# Patient Record
Sex: FEMALE | Race: WHITE | NOT HISPANIC OR LATINO | Employment: OTHER | URBAN - METROPOLITAN AREA
[De-identification: names, ages, dates, MRNs, and addresses within clinical notes are randomized per-mention and may not be internally consistent; named-entity substitution may affect disease eponyms.]

---

## 2023-03-24 DIAGNOSIS — E78.2 MIXED HYPERLIPIDEMIA: Primary | ICD-10-CM

## 2023-03-27 RX ORDER — SIMVASTATIN 40 MG/1
40 TABLET, FILM COATED ORAL DAILY
Qty: 90 TABLET | Refills: 0 | Status: SHIPPED | OUTPATIENT
Start: 2023-03-27 | End: 2023-04-10 | Stop reason: SDUPTHER

## 2023-03-27 RX ORDER — SIMVASTATIN 40 MG/1
40 TABLET, FILM COATED ORAL DAILY
Qty: 30 TABLET | Refills: 0 | Status: SHIPPED | OUTPATIENT
Start: 2023-03-27 | End: 2023-03-27 | Stop reason: SDUPTHER

## 2023-04-10 ENCOUNTER — TELEMEDICINE (OUTPATIENT)
Dept: PRIMARY CARE | Facility: CLINIC | Age: 73
End: 2023-04-10
Payer: MEDICARE

## 2023-04-10 DIAGNOSIS — J39.9 UPPER RESPIRATORY DISEASE: ICD-10-CM

## 2023-04-10 DIAGNOSIS — Z00.00 HEALTH CARE MAINTENANCE: ICD-10-CM

## 2023-04-10 DIAGNOSIS — R05.1 ACUTE COUGH: Primary | ICD-10-CM

## 2023-04-10 DIAGNOSIS — E78.2 MIXED HYPERLIPIDEMIA: ICD-10-CM

## 2023-04-10 PROCEDURE — 99213 OFFICE O/P EST LOW 20 MIN: CPT | Performed by: STUDENT IN AN ORGANIZED HEALTH CARE EDUCATION/TRAINING PROGRAM

## 2023-04-10 RX ORDER — SIMVASTATIN 40 MG/1
TABLET, FILM COATED ORAL
COMMUNITY
Start: 2014-06-10 | End: 2023-11-27 | Stop reason: SDUPTHER

## 2023-04-10 RX ORDER — BENZONATATE 100 MG/1
100 CAPSULE ORAL 3 TIMES DAILY PRN
Qty: 42 CAPSULE | Refills: 0 | Status: SHIPPED | OUTPATIENT
Start: 2023-04-10 | End: 2023-05-10

## 2023-04-10 RX ORDER — ZOLPIDEM TARTRATE 10 MG
TABLET ORAL
COMMUNITY
Start: 2021-10-01 | End: 2023-10-18 | Stop reason: ALTCHOICE

## 2023-04-10 RX ORDER — FLUTICASONE PROPIONATE 50 MCG
SPRAY, SUSPENSION (ML) NASAL
COMMUNITY
Start: 2022-09-02 | End: 2023-10-18 | Stop reason: ALTCHOICE

## 2023-04-10 RX ORDER — IBUPROFEN 800 MG/1
1 TABLET ORAL 2 TIMES DAILY PRN
COMMUNITY
Start: 2016-05-24

## 2023-04-10 RX ORDER — AMOXICILLIN AND CLAVULANATE POTASSIUM 875; 125 MG/1; MG/1
875 TABLET, FILM COATED ORAL 2 TIMES DAILY
Qty: 14 TABLET | Refills: 0 | Status: SHIPPED | OUTPATIENT
Start: 2023-04-10 | End: 2023-04-17

## 2023-04-10 RX ORDER — NAPROXEN SODIUM 220 MG/1
1 TABLET, FILM COATED ORAL DAILY
COMMUNITY
Start: 2021-09-27

## 2023-04-10 RX ORDER — SIMVASTATIN 40 MG/1
40 TABLET, FILM COATED ORAL DAILY
Qty: 90 TABLET | Refills: 1 | Status: SHIPPED | OUTPATIENT
Start: 2023-04-10 | End: 2023-10-17

## 2023-04-10 RX ORDER — CHLORHEXIDINE GLUCONATE ORAL RINSE 1.2 MG/ML
SOLUTION DENTAL
COMMUNITY
Start: 2022-06-24 | End: 2023-11-27 | Stop reason: ALTCHOICE

## 2023-04-10 RX ORDER — HYDROCODONE BITARTRATE AND ACETAMINOPHEN 5; 325 MG/1; MG/1
TABLET ORAL
COMMUNITY
Start: 2022-06-24 | End: 2023-10-18 | Stop reason: ALTCHOICE

## 2023-04-10 RX ORDER — TRIAMCINOLONE ACETONIDE 1 MG/G
CREAM TOPICAL
COMMUNITY
Start: 2023-03-10 | End: 2024-01-15 | Stop reason: WASHOUT

## 2023-04-10 RX ORDER — AZITHROMYCIN 250 MG/1
TABLET, FILM COATED ORAL
COMMUNITY
Start: 2023-04-09 | End: 2023-10-17

## 2023-04-10 RX ORDER — ERGOCALCIFEROL 1.25 MG/1
1 CAPSULE ORAL
COMMUNITY
Start: 2014-05-15 | End: 2023-10-18 | Stop reason: ALTCHOICE

## 2023-04-10 ASSESSMENT — ENCOUNTER SYMPTOMS
CARDIOVASCULAR NEGATIVE: 1
MUSCULOSKELETAL NEGATIVE: 1
SINUS PRESSURE: 1
NEUROLOGICAL NEGATIVE: 1
CONSTITUTIONAL NEGATIVE: 1
SINUS PAIN: 1
SORE THROAT: 1
PSYCHIATRIC NEGATIVE: 1
RESPIRATORY NEGATIVE: 1
GASTROINTESTINAL NEGATIVE: 1

## 2023-04-10 NOTE — PROGRESS NOTES
Subjective   Patient ID: Maddie Briseno is a 73 y.o. female who presents for Cough (Cough, congestion, U.C follow up).    Patient seen on virtual evaluation.  Regards that she has been having some symptoms of congestion and overall not feeling well.  Did recently go to urgent care.  States that at the urgent care, she was given a course of azithromycin.  States that her symptoms have been improving slowly, but she is still having symptoms.  She is wondering about other steps or what else she could do.  Tested negative for COVID.  Denies any additional issues or concerns at this time.         Review of Systems   Constitutional: Negative.    HENT:  Positive for postnasal drip, sinus pressure, sinus pain and sore throat.    Respiratory: Negative.     Cardiovascular: Negative.    Gastrointestinal: Negative.    Musculoskeletal: Negative.    Skin: Negative.    Neurological: Negative.    Psychiatric/Behavioral: Negative.         Objective   There were no vitals taken for this visit.    Physical Exam deferred due to virtual evaluation    Assessment/Plan   Problem List Items Addressed This Visit    None  Visit Diagnoses       Acute cough    -  Primary    Relevant Medications    benzonatate (Tessalon) 100 mg capsule    Upper respiratory disease        Relevant Medications    amoxicillin-pot clavulanate (Augmentin) 875-125 mg tablet    Mixed hyperlipidemia        Relevant Medications    simvastatin (Zocor) 40 mg tablet    Health care maintenance        Relevant Orders    Lipid panel    Hemoglobin A1C          Patient seen on virtual evaluation    #Upper respiratory infection  #Sinus infection  Patient recently seen in urgent care due to sinus infection, has been appropriately treated for this, recommend continue azithromycin, we will prescribe Tessalon Perles for symptomatic control.  If patient has no improvement with azithromycin, recommend course of Augmentin as she has had better success with this in the past.  She will  continue to monitor symptoms, could consider x-ray if no improvement after courses of antibiotics    #Hyperlipidemia  On cholesterol medicine, refill today, advised lipid panel and A1c for further evaluation    #Health maintenance  Labs as above  Advised age-appropriate screenings    Return to care in 6 months or as needed

## 2023-09-08 DIAGNOSIS — R60.0 LOWER EXTREMITY EDEMA: Primary | ICD-10-CM

## 2023-09-08 DIAGNOSIS — M19.049 HAND ARTHRITIS: ICD-10-CM

## 2023-09-08 RX ORDER — FUROSEMIDE 20 MG/1
20 TABLET ORAL DAILY
Qty: 90 TABLET | Refills: 0 | Status: SHIPPED | OUTPATIENT
Start: 2023-09-08 | End: 2024-04-15 | Stop reason: WASHOUT

## 2023-10-04 ENCOUNTER — APPOINTMENT (OUTPATIENT)
Dept: LAB | Facility: CLINIC | Age: 73
End: 2023-10-04
Payer: MEDICARE

## 2023-10-04 ENCOUNTER — APPOINTMENT (OUTPATIENT)
Dept: HEMATOLOGY/ONCOLOGY | Facility: CLINIC | Age: 73
End: 2023-10-04
Payer: MEDICARE

## 2023-10-17 PROBLEM — R92.30 DENSE BREAST TISSUE: Status: ACTIVE | Noted: 2023-10-17

## 2023-10-17 PROBLEM — G45.9 TRANSIENT ISCHEMIC ATTACK: Status: ACTIVE | Noted: 2023-10-17

## 2023-10-17 PROBLEM — R35.0 URINARY FREQUENCY: Status: ACTIVE | Noted: 2023-10-17

## 2023-10-17 PROBLEM — N95.2 VAGINAL ATROPHY: Status: ACTIVE | Noted: 2023-10-17

## 2023-10-17 PROBLEM — Q21.12 PFO (PATENT FORAMEN OVALE) (HHS-HCC): Status: ACTIVE | Noted: 2023-10-17

## 2023-10-17 PROBLEM — R42 INTERMITTENT LIGHTHEADEDNESS: Status: ACTIVE | Noted: 2023-10-17

## 2023-10-17 PROBLEM — E55.9 VITAMIN D DEFICIENCY: Status: ACTIVE | Noted: 2023-10-17

## 2023-10-17 PROBLEM — I10 HTN (HYPERTENSION): Status: ACTIVE | Noted: 2023-10-17

## 2023-10-17 PROBLEM — R00.2 HEART PALPITATIONS: Status: ACTIVE | Noted: 2023-10-17

## 2023-10-17 PROBLEM — K60.2 ANAL FISSURE: Status: ACTIVE | Noted: 2023-10-17

## 2023-10-17 PROBLEM — G47.00 INSOMNIA: Status: ACTIVE | Noted: 2023-10-17

## 2023-10-17 RX ORDER — BIOTIN 5 MG
5 TABLET ORAL
COMMUNITY
End: 2023-10-18 | Stop reason: ALTCHOICE

## 2023-10-17 RX ORDER — EPINEPHRINE 0.22MG
100 AEROSOL WITH ADAPTER (ML) INHALATION
COMMUNITY

## 2023-10-17 RX ORDER — GLUCOSAMINE/D3/BOSWELLIA SERRA 1500MG-400
1 TABLET ORAL
COMMUNITY
End: 2023-10-18 | Stop reason: ALTCHOICE

## 2023-10-17 RX ORDER — MECLIZINE HYDROCHLORIDE 25 MG/1
1 TABLET ORAL 3 TIMES DAILY PRN
COMMUNITY
Start: 2022-10-19 | End: 2023-10-18 | Stop reason: ALTCHOICE

## 2023-10-17 RX ORDER — MIRABEGRON 50 MG/1
50 TABLET, FILM COATED, EXTENDED RELEASE ORAL DAILY
COMMUNITY
End: 2024-01-15 | Stop reason: WASHOUT

## 2023-10-18 ENCOUNTER — OFFICE VISIT (OUTPATIENT)
Dept: CARDIOLOGY | Facility: CLINIC | Age: 73
End: 2023-10-18
Payer: MEDICARE

## 2023-10-18 VITALS
WEIGHT: 175 LBS | SYSTOLIC BLOOD PRESSURE: 130 MMHG | BODY MASS INDEX: 32.2 KG/M2 | HEART RATE: 74 BPM | OXYGEN SATURATION: 98 % | HEIGHT: 62 IN | DIASTOLIC BLOOD PRESSURE: 76 MMHG

## 2023-10-18 DIAGNOSIS — I10 PRIMARY HYPERTENSION: ICD-10-CM

## 2023-10-18 DIAGNOSIS — R60.0 LOCALIZED EDEMA: Primary | ICD-10-CM

## 2023-10-18 PROCEDURE — 3078F DIAST BP <80 MM HG: CPT | Performed by: PHYSICIAN ASSISTANT

## 2023-10-18 PROCEDURE — 3075F SYST BP GE 130 - 139MM HG: CPT | Performed by: PHYSICIAN ASSISTANT

## 2023-10-18 PROCEDURE — 1126F AMNT PAIN NOTED NONE PRSNT: CPT | Performed by: PHYSICIAN ASSISTANT

## 2023-10-18 PROCEDURE — 1159F MED LIST DOCD IN RCRD: CPT | Performed by: PHYSICIAN ASSISTANT

## 2023-10-18 PROCEDURE — 99214 OFFICE O/P EST MOD 30 MIN: CPT | Performed by: PHYSICIAN ASSISTANT

## 2023-10-18 ASSESSMENT — PAIN SCALES - GENERAL: PAINLEVEL: 0-NO PAIN

## 2023-10-18 NOTE — PROGRESS NOTES
"Chief Complaint:   C/O edema on both ankles x a couple months     History Of Present Illness:    Maddie Briseno is a 73 y.o. female presenting with peripheral edema.  Patient reports persistent bilateral lower extremity edema over the past few months.  This was previously treated with furosemide 20mg every other day, however due to patient's active work schedule and inability to frequent the restroom she has not taken this for some time.  No associated dyspnea on exertion, chest pain/pressure, PND, orthopnea, syncope, claudication.  Patient is not treated with antihypertensive therapy at this time.  Previous 2D echo with normal LV systolic function and no considerable valvulopathy.     Last Recorded Vitals:  Vitals:    10/18/23 1455   BP: 130/76   BP Location: Right arm   Patient Position: Sitting   BP Cuff Size: Adult   Pulse: 74   SpO2: 98%   Weight: 79.4 kg (175 lb)   Height: 1.575 m (5' 2\")       Past Medical History:  She has a past medical history of Disease of upper respiratory tract, unspecified, Personal history of other diseases of the musculoskeletal system and connective tissue, and Personal history of other endocrine, nutritional and metabolic disease.    Past Surgical History:  She has a past surgical history that includes Other surgical history (2014);  section, classic (2014); Colonoscopy (2015); Back surgery (2015); Other surgical history (10/13/2021); CT angio head w and wo IV contrast (2021); and CT angio neck w and wo IV contrast (2021).      Social History:  She has no history on file for tobacco use, alcohol use, and drug use.    Family History:  No family history on file.     Allergies:  Cephalexin    Outpatient Medications:  Current Outpatient Medications   Medication Instructions    aspirin 81 mg chewable tablet 1 tablet, oral, Daily    chlorhexidine (Peridex) 0.12 % solution RINSE 1/2 FLUID OUNCE IN MOUTH FOR 30 SECONDS AND SPIT OUT TWICE DAILY " (MORNING AND EVENING) AFTER BRUSHING. NO FLUIDS AFTERWARDS FOR 30 MIN    coenzyme Q-10 100 mg, oral, Daily RT    furosemide (LASIX) 20 mg, oral, Daily    ibuprofen 800 mg tablet 1 tablet, oral, 2 times daily PRN    mv-mn/om3/dha/epa/fish/lut/geovanny (OCUVITE ADULT 50 PLUS ORAL) 1 tablet, oral, Daily RT    Myrbetriq 50 mg, oral, Daily    simvastatin (Zocor) 40 mg tablet oral    triamcinolone (Kenalog) 0.1 % cream APPLY THIN FILM TO LEG RASH 1 TO 2 TIMES DAILY       Physical Exam:  Constitutional: awake and alert, oriented ×3, no apparent distress  Skin: warm, dry, good turgor no obvious lesions  Eyes: pupils equal, round, reactive to light, conjunctiva pink and noninjected, no discharge  HENT: normocephalic and atraumatic, mucous membranes moist, trachea midline with no masses/goiter  Cardiovascular: S1/S2 regular, no murmur no rubs/gallops, no carotid bruits, no JVD  Pulmonary: symmetrical chest expansion, lungs are clear to auscultation bilaterally, no wheezes/rales/rhonchi, normal effort  Abdomen: nontender, nondistended, active bowel sounds, no ascites  Extremities: no cyanosis, clubbing, no LE edema no lesions; palpable pedal pulses  Neurologic: cranial nerves II - XII grossly intact, stable gait, no tremor       Last Labs:  CBC -  Lab Results   Component Value Date    WBC 4.7 03/03/2023    HGB 13.2 03/03/2023    HCT 42.2 03/03/2023    MCV 86 03/03/2023     03/03/2023       CMP -  Lab Results   Component Value Date    CALCIUM 9.0 03/03/2023    PROT 6.6 03/03/2023    ALBUMIN 4.1 03/03/2023    AST 18 03/03/2023    ALT 15 03/03/2023    ALKPHOS 58 03/03/2023    BILITOT 0.7 03/03/2023       LIPID PANEL -   Lab Results   Component Value Date    CHOL 148 09/26/2021    TRIG 62 09/26/2021    HDL 53.0 09/26/2021    CHHDL 2.8 09/26/2021    LDLF 83 09/26/2021    VLDL 12 09/26/2021       RENAL FUNCTION PANEL -   Lab Results   Component Value Date    GLUCOSE 112 (H) 03/03/2023     03/03/2023    K 3.8 03/03/2023    CL  "106 03/03/2023    CO2 25 03/03/2023    ANIONGAP 12 03/03/2023    BUN 17 03/03/2023    CREATININE 0.95 03/03/2023    CALCIUM 9.0 03/03/2023    ALBUMIN 4.1 03/03/2023        Lab Results   Component Value Date     (H) 09/26/2021    HGBA1C 5.8 (A) 09/26/2021       Last Cardiology Tests:  ECG:  No results found for this or any previous visit from the past 1095 days.      Echo:  9/2021  1. The left ventricular systolic function is normal with a 55-60% estimated ejection fraction.   2. Spectral Doppler shows an impaired relaxation pattern of left ventricular diastolic filling.   3. The left atrium is moderately dilated.   4. RVSP within normal limits.   5. Positive Saline bubble study suggesting PFO.    Ejection Fractions:  No results found for: \"EF\"    Cath:  No results found for this or any previous visit from the past 1095 days.      Stress Test:  No results found for this or any previous visit from the past 1095 days.      Cardiac Imaging:  No results found for this or any previous visit from the past 1095 days.            Assessment/Plan   Problem List Items Addressed This Visit             ICD-10-CM       Cardiac and Vasculature    HTN (hypertension) I10     Other Visit Diagnoses         Codes    Localized edema    -  Primary R60.0    Relevant Orders    Transthoracic echo (TTE) complete          -We will arrange for a 2D echocardiogram to assess LVEF and valvular function.    -Take furosemide 20mg on all days you are not required to be at work.    -No additional signs/symptoms concerning for volume overload.    -F/U in 3 months.    Amador Mota PA-C  "

## 2023-10-23 PROBLEM — D47.9 LYMPHOPROLIFERATIVE DISORDER (MULTI): Status: ACTIVE | Noted: 2023-10-23

## 2023-10-25 ENCOUNTER — APPOINTMENT (OUTPATIENT)
Dept: CARDIOLOGY | Facility: CLINIC | Age: 73
End: 2023-10-25
Payer: MEDICARE

## 2023-10-26 ENCOUNTER — LAB (OUTPATIENT)
Dept: LAB | Facility: CLINIC | Age: 73
End: 2023-10-26
Payer: MEDICARE

## 2023-10-26 ENCOUNTER — OFFICE VISIT (OUTPATIENT)
Dept: HEMATOLOGY/ONCOLOGY | Facility: CLINIC | Age: 73
End: 2023-10-26
Payer: MEDICARE

## 2023-10-26 VITALS
SYSTOLIC BLOOD PRESSURE: 130 MMHG | TEMPERATURE: 97.9 F | DIASTOLIC BLOOD PRESSURE: 73 MMHG | WEIGHT: 173.06 LBS | HEIGHT: 62 IN | OXYGEN SATURATION: 94 % | BODY MASS INDEX: 31.85 KG/M2 | HEART RATE: 72 BPM | RESPIRATION RATE: 20 BRPM

## 2023-10-26 DIAGNOSIS — D59.4 HEMOLYTIC ANEMIA ASSOCIATED WITH LYMPHOPROLIFERATIVE DISORDER (MULTI): ICD-10-CM

## 2023-10-26 DIAGNOSIS — D47.9 HEMOLYTIC ANEMIA ASSOCIATED WITH LYMPHOPROLIFERATIVE DISORDER (MULTI): ICD-10-CM

## 2023-10-26 DIAGNOSIS — D47.9 LYMPHOPROLIFERATIVE DISORDER (MULTI): Primary | ICD-10-CM

## 2023-10-26 LAB
ALBUMIN SERPL BCP-MCNC: 4.4 G/DL (ref 3.4–5)
ALP SERPL-CCNC: 69 U/L (ref 33–136)
ALT SERPL W P-5'-P-CCNC: 17 U/L (ref 7–45)
ANION GAP SERPL CALC-SCNC: 13 MMOL/L (ref 10–20)
AST SERPL W P-5'-P-CCNC: 21 U/L (ref 9–39)
BASOPHILS # BLD AUTO: 0.07 X10*3/UL (ref 0–0.1)
BASOPHILS NFR BLD AUTO: 1.2 %
BILIRUB SERPL-MCNC: 0.5 MG/DL (ref 0–1.2)
BUN SERPL-MCNC: 16 MG/DL (ref 6–23)
CALCIUM SERPL-MCNC: 9.2 MG/DL (ref 8.6–10.3)
CHLORIDE SERPL-SCNC: 103 MMOL/L (ref 98–107)
CO2 SERPL-SCNC: 26 MMOL/L (ref 21–32)
CREAT SERPL-MCNC: 1.07 MG/DL (ref 0.5–1.05)
EOSINOPHIL # BLD AUTO: 0.12 X10*3/UL (ref 0–0.4)
EOSINOPHIL NFR BLD AUTO: 2 %
ERYTHROCYTE [DISTWIDTH] IN BLOOD BY AUTOMATED COUNT: 12.8 % (ref 11.5–14.5)
GFR SERPL CREATININE-BSD FRML MDRD: 55 ML/MIN/1.73M*2
GLUCOSE SERPL-MCNC: 131 MG/DL (ref 74–99)
HCT VFR BLD AUTO: 40.9 % (ref 36–46)
HGB BLD-MCNC: 13.3 G/DL (ref 12–16)
IMM GRANULOCYTES # BLD AUTO: 0.02 X10*3/UL (ref 0–0.5)
IMM GRANULOCYTES NFR BLD AUTO: 0.3 % (ref 0–0.9)
LYMPHOCYTES # BLD AUTO: 1.65 X10*3/UL (ref 0.8–3)
LYMPHOCYTES NFR BLD AUTO: 27.7 %
MCH RBC QN AUTO: 27.6 PG (ref 26–34)
MCHC RBC AUTO-ENTMCNC: 32.5 G/DL (ref 32–36)
MCV RBC AUTO: 85 FL (ref 80–100)
MONOCYTES # BLD AUTO: 0.4 X10*3/UL (ref 0.05–0.8)
MONOCYTES NFR BLD AUTO: 6.7 %
NEUTROPHILS # BLD AUTO: 3.7 X10*3/UL (ref 1.6–5.5)
NEUTROPHILS NFR BLD AUTO: 62.1 %
NRBC BLD-RTO: 0 /100 WBCS (ref 0–0)
PLATELET # BLD AUTO: 210 X10*3/UL (ref 150–450)
PMV BLD AUTO: 9 FL (ref 7.5–11.5)
POTASSIUM SERPL-SCNC: 3.7 MMOL/L (ref 3.5–5.3)
PROT SERPL-MCNC: 6.7 G/DL (ref 6.4–8.2)
RBC # BLD AUTO: 4.82 X10*6/UL (ref 4–5.2)
SODIUM SERPL-SCNC: 138 MMOL/L (ref 136–145)
WBC # BLD AUTO: 6 X10*3/UL (ref 4.4–11.3)

## 2023-10-26 PROCEDURE — 99213 OFFICE O/P EST LOW 20 MIN: CPT | Performed by: INTERNAL MEDICINE

## 2023-10-26 PROCEDURE — 3078F DIAST BP <80 MM HG: CPT | Performed by: INTERNAL MEDICINE

## 2023-10-26 PROCEDURE — 1126F AMNT PAIN NOTED NONE PRSNT: CPT | Performed by: INTERNAL MEDICINE

## 2023-10-26 PROCEDURE — 3075F SYST BP GE 130 - 139MM HG: CPT | Performed by: INTERNAL MEDICINE

## 2023-10-26 PROCEDURE — 85025 COMPLETE CBC W/AUTO DIFF WBC: CPT

## 2023-10-26 PROCEDURE — 80053 COMPREHEN METABOLIC PANEL: CPT

## 2023-10-26 PROCEDURE — 36415 COLL VENOUS BLD VENIPUNCTURE: CPT

## 2023-10-26 PROCEDURE — 1159F MED LIST DOCD IN RCRD: CPT | Performed by: INTERNAL MEDICINE

## 2023-10-26 ASSESSMENT — PAIN SCALES - GENERAL: PAINLEVEL: 0-NO PAIN

## 2023-10-26 NOTE — PROGRESS NOTES
LOCATION:  [facility] at [facility].     HEMATOLOGY & ONCOLOGY PROBLEMS:  1.  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       a.  Incidental finding on routine colonoscopy in 2022.       b.  Negative staging PET scan.          c.  Currently being followed by close observation.     CHIEF COMPLAINT:    The patient is in the clinic today for management of colonic low-grade non-Hodgkin lymphoma.     HISTORY:   Ms. Briseno is a 72 year old pleasant female with incidental finding of atypical lymphoid infiltrate near the appendiceal orifice on routine colonoscopy in 2022.  Clinically she has issues with generalized weakness and fatigue but no other specific  localizing signs and symptoms.  Routine colonoscopy done by  revealed lymphoid aggregates near the appendiceal origin with further histochemical and pathology review suggestive of most probable low-grade lymphoproliferative disorder.  Her last  colonoscopy in  was unremarkable.  A staging PET scan was unremarkable other than mild activity at the colon biopsy site.  There was no evidence of visceral lymphadenopathy etc.  She is currently being followed by close observation.     INTERVAL HISTORY:  Denies any specific new complaints.  No history of fever, weight loss or night sweats. She has been recently started on diuretics for ankle edema.     PAST MEDICAL HISTORY:   1.  Hyperlipidemia.   2.  History of TIA.   3.  Chronic insomnia   4.  Degenerative joint disease   5.  History of C-sections, back / wrist and cervical fusion surgeries.     SOCIAL HISTORY:    for 41 years and lives in [place].  Non-smoker.  Rare social alcohol intake.  She work as a part-time  at a local drug [employer].  Born and raised in [place].     FAMILY HISTORY:    Father  at age 58 from myocardial infarction.  Mother  at age 92 from lung cancer.  1 brother  at age 72, patient not aware of the medical  details.  2 children and 5 grandkids all alive and well. No other specific history of bleeding, clotting  or malignant disorder in the family.     REVIEW OF SYSTEMS:  Pertinent finding as per the history above.  All other systems have been reviewed and generally negative and noncontributory.     PHYSICAL EXAMINATION:    Detailed examination not done.     Allergies and Intolerances:       Allergies:         Keflex: Drug, Other, Active     Outpatient Medication Profile:  * Patient Currently Takes Medications as of 03-Mar-2023 13:01 documented in Structured Notes         simvastatin 40 mg oral tablet : Last Dose Taken:  , 1 tab(s) orally once a day (at bedtime) , Start Date: 27-Sep-2021         aspirin 81 mg oral tablet, chewable: Last Dose Taken:  , 1 tab(s) orally  once a day, Start Date: 27-Sep-2021         Ambien 10 mg oral tablet: Last Dose Taken:  , 1/4 tab(s) orally once  a day (at bedtime)         ZOLPIDEM         Vitamin D3: Last Dose Taken:  , [address] international unit(s) orally once  a week     Lab Results:  CBC and CMP from today is unremarkable.     Radiology Result:  PET/CT Lymphoma Staging [Feb 7 2023  2:47PM]  Impression:  1. Moderately intense focal hypermetabolic activity with associated wall thickening in the distal cecum, unchanged from prior PET-CT and  likely corresponding the patient's biopsy-proven low-grade B-cell lymphoma.  2. No new sites of focal hypermetabolic disease to suggest lymphomatous involvement.     PET/CT Lymphoma Initial [Apr 19 2022  2:57PM]  Impression:  1. Focal hypermetabolic activity associated with mild wall thickening of the distal cecum which may represent patient's known low-grade  non-Hodgkin's lymphoma (as stated in the Hematology/Oncology note from EMR chart review).  2. No abnormal hypermetabolic activity elsewhere in the body to suggest lymphomatous involvement.    Pathology Results:  Specimens: [facility], [id number] ( 2/8/2022)   Name [facility]    Accession #: [id number]   Pathologist: FANY MORELAND JR, MD, PhD.   Date of Procedure: 6/20/2022    Submitting Physician: PASTORA BENITEZ MD   Location: [facility] Other External # [id number]   FINAL DIAGNOSIS   A. COLON, BIOPSY([facility],  (2/8/2022)):   -- LOW GRADE B CELL LYMPHOMA, FAVOR EXTRANODAL MARGINAL ZONE LYMPHOMA.    MORPHOLOGY: The specimen consist of several fragments of colonic mucosa effaced by sheets of small lymphoid cells with slightly irregular nuclear contour and scant cytoplasm. A focal lymphoepithelial lesion is seen.   IMMUNOHISTOCHEMISTRY:   Immunohistochemical  stains performed at the outside institution and reviewed at [facility] reveal the atypical lymphoid infiltrate is   CD20: Positive   BCL2: Positive   CD3 & CD5: Negative. Highlights background T cells.   Cyclin D1: Negative   CD10: Negative.  Highlights few residual germinal centers.   CD43: Negative. Highlights background T cells and plasma cells   No meshwork stains was performed. No kappa/lambda staining to evaluate for plasma cell light chain restriction was performed. Ki67 proliferation  index was not assesed.   MOLECULAR STUDIES: Per report molecular studies was positive for a clonal IgH and Ig kappa rearrangement.   CYTOGENETICS: FISH performed and interpreted at outside institution, ([facility], [place], [place]), was reported to be negative for BIRC-MALT1 fusion.   The gross and/or microscopic findings were reviewed in conjunction with pathology resident, BROOKLYN Lott.   Electronically Signed Out By FANY MORELAND JR, MD,  PhD./DODIE      Assessment and Plan:   1.  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 Please refer to the details of initial presentation and management as outlined above. In  summary, patient with incidental finding of low-grade B-cell lymphoproliferative disorder involving the colon on routine colonoscopy in Feb 2022.  Clinically  she has issues with generalized anxiety and weakness but no specific history of night sweats,  weight loss or fever.  After initial hematology oncology evaluation of follow-up PET scan was completely unremarkable.     Again, long discussion with the patient and her  and they were explained about the initial pathology reports and the PET scan findings.  She had a very isolated foci of low-grade lymphoproliferative lesion in the colon.  There is no evidence of  locoregional lymphadenopathy or metastatic disease.  Clinically she is essentially asymptomatic and follow-up blood work has been unremarkable.  She is currently being followed by close observation.     2. Follow up:  Patient will return to the clinic in 6 months.  Advised to contact us immediately if there are any new questions or problems.     This note has been transcribed using Dragon voice recognition system and there is a possibility of unintentional typing misprints.

## 2023-10-30 ENCOUNTER — HOSPITAL ENCOUNTER (OUTPATIENT)
Dept: CARDIOLOGY | Facility: CLINIC | Age: 73
Discharge: HOME | End: 2023-10-30
Payer: MEDICARE

## 2023-10-30 DIAGNOSIS — R60.0 LOCALIZED EDEMA: ICD-10-CM

## 2023-10-30 LAB — EJECTION FRACTION APICAL 4 CHAMBER: 62.8

## 2023-10-30 PROCEDURE — 93306 TTE W/DOPPLER COMPLETE: CPT | Performed by: STUDENT IN AN ORGANIZED HEALTH CARE EDUCATION/TRAINING PROGRAM

## 2023-10-30 PROCEDURE — 93306 TTE W/DOPPLER COMPLETE: CPT

## 2023-11-01 ENCOUNTER — APPOINTMENT (OUTPATIENT)
Dept: OBSTETRICS AND GYNECOLOGY | Facility: CLINIC | Age: 73
End: 2023-11-01
Payer: MEDICARE

## 2023-11-27 ENCOUNTER — OFFICE VISIT (OUTPATIENT)
Dept: PRIMARY CARE | Facility: CLINIC | Age: 73
End: 2023-11-27
Payer: MEDICARE

## 2023-11-27 VITALS — WEIGHT: 175 LBS | DIASTOLIC BLOOD PRESSURE: 68 MMHG | SYSTOLIC BLOOD PRESSURE: 106 MMHG | BODY MASS INDEX: 31.96 KG/M2

## 2023-11-27 DIAGNOSIS — Z00.00 ROUTINE GENERAL MEDICAL EXAMINATION AT HEALTH CARE FACILITY: Primary | ICD-10-CM

## 2023-11-27 DIAGNOSIS — Z12.31 ENCOUNTER FOR SCREENING MAMMOGRAM FOR BREAST CANCER: ICD-10-CM

## 2023-11-27 DIAGNOSIS — Z23 INFLUENZA VACCINE NEEDED: ICD-10-CM

## 2023-11-27 DIAGNOSIS — D17.9 LIPOMA, UNSPECIFIED SITE: ICD-10-CM

## 2023-11-27 DIAGNOSIS — E78.2 MIXED HYPERLIPIDEMIA: ICD-10-CM

## 2023-11-27 PROCEDURE — 1160F RVW MEDS BY RX/DR IN RCRD: CPT | Performed by: STUDENT IN AN ORGANIZED HEALTH CARE EDUCATION/TRAINING PROGRAM

## 2023-11-27 PROCEDURE — G0008 ADMIN INFLUENZA VIRUS VAC: HCPCS | Performed by: STUDENT IN AN ORGANIZED HEALTH CARE EDUCATION/TRAINING PROGRAM

## 2023-11-27 PROCEDURE — G0439 PPPS, SUBSEQ VISIT: HCPCS | Performed by: STUDENT IN AN ORGANIZED HEALTH CARE EDUCATION/TRAINING PROGRAM

## 2023-11-27 PROCEDURE — 1126F AMNT PAIN NOTED NONE PRSNT: CPT | Performed by: STUDENT IN AN ORGANIZED HEALTH CARE EDUCATION/TRAINING PROGRAM

## 2023-11-27 PROCEDURE — 1170F FXNL STATUS ASSESSED: CPT | Performed by: STUDENT IN AN ORGANIZED HEALTH CARE EDUCATION/TRAINING PROGRAM

## 2023-11-27 PROCEDURE — 90686 IIV4 VACC NO PRSV 0.5 ML IM: CPT | Performed by: STUDENT IN AN ORGANIZED HEALTH CARE EDUCATION/TRAINING PROGRAM

## 2023-11-27 PROCEDURE — 99213 OFFICE O/P EST LOW 20 MIN: CPT | Performed by: STUDENT IN AN ORGANIZED HEALTH CARE EDUCATION/TRAINING PROGRAM

## 2023-11-27 PROCEDURE — 1159F MED LIST DOCD IN RCRD: CPT | Performed by: STUDENT IN AN ORGANIZED HEALTH CARE EDUCATION/TRAINING PROGRAM

## 2023-11-27 PROCEDURE — 3074F SYST BP LT 130 MM HG: CPT | Performed by: STUDENT IN AN ORGANIZED HEALTH CARE EDUCATION/TRAINING PROGRAM

## 2023-11-27 PROCEDURE — 1036F TOBACCO NON-USER: CPT | Performed by: STUDENT IN AN ORGANIZED HEALTH CARE EDUCATION/TRAINING PROGRAM

## 2023-11-27 PROCEDURE — 3078F DIAST BP <80 MM HG: CPT | Performed by: STUDENT IN AN ORGANIZED HEALTH CARE EDUCATION/TRAINING PROGRAM

## 2023-11-27 RX ORDER — SIMVASTATIN 40 MG/1
40 TABLET, FILM COATED ORAL NIGHTLY
Qty: 90 TABLET | Refills: 1 | Status: SHIPPED | OUTPATIENT
Start: 2023-11-27

## 2023-11-27 ASSESSMENT — PATIENT HEALTH QUESTIONNAIRE - PHQ9
SUM OF ALL RESPONSES TO PHQ9 QUESTIONS 1 AND 2: 0
1. LITTLE INTEREST OR PLEASURE IN DOING THINGS: NOT AT ALL
2. FEELING DOWN, DEPRESSED OR HOPELESS: NOT AT ALL

## 2023-11-27 ASSESSMENT — ACTIVITIES OF DAILY LIVING (ADL)
GROCERY_SHOPPING: INDEPENDENT
DOING_HOUSEWORK: INDEPENDENT
MANAGING_FINANCES: INDEPENDENT
BATHING: INDEPENDENT
TAKING_MEDICATION: INDEPENDENT
DRESSING: INDEPENDENT

## 2023-11-27 ASSESSMENT — ENCOUNTER SYMPTOMS
CARDIOVASCULAR NEGATIVE: 1
GASTROINTESTINAL NEGATIVE: 1
PSYCHIATRIC NEGATIVE: 1
CONSTITUTIONAL NEGATIVE: 1
NEUROLOGICAL NEGATIVE: 1
MUSCULOSKELETAL NEGATIVE: 1
RESPIRATORY NEGATIVE: 1

## 2023-11-27 NOTE — PROGRESS NOTES
Follow up and med refill and bumps on head and wellness visit        Subjective   Reason for Visit: Maddie Briseno is an 73 y.o. female here for a Medicare Wellness visit.          Reviewed all medications by prescribing practitioner or clinical pharmacist (such as prescriptions, OTCs, herbal therapies and supplements) and documented in the medical record.    HPI    Patient Care Team:  Carl Melissa DO as PCP - General  Loc Reeder MD as Consulting Physician (Hematology and Oncology)     Review of Systems    Objective   Vitals:  /68   Wt 79.4 kg (175 lb)   BMI 31.96 kg/m²       Physical Exam    Assessment/Plan   Problem List Items Addressed This Visit    None

## 2023-11-27 NOTE — PROGRESS NOTES
"Subjective   Reason for Visit: Maddie Briseno is an 73 y.o. female here for a Medicare Wellness visit.     Past Medical, Surgical, and Family History reviewed and updated in chart.    Reviewed all medications by prescribing practitioner or clinical pharmacist (such as prescriptions, OTCs, herbal therapies and supplements) and documented in the medical record.    Patient seen on follow-up.  States that she is overall doing well.  Has started noticing some \"bumps\" on her head that have been noticed by her hairdresser.  States these are nonpainful in nature, however when rubbed multiple times they do get irritated.  Otherwise, states she is overall doing well would like a flu and RSV vaccine as well.        Patient Care Team:  Carl Melissa DO as PCP - General  Loc Reeder MD as Consulting Physician (Hematology and Oncology)     Review of Systems   Constitutional: Negative.    HENT: Negative.     Respiratory: Negative.     Cardiovascular: Negative.    Gastrointestinal: Negative.    Musculoskeletal: Negative.    Neurological: Negative.    Psychiatric/Behavioral: Negative.         Objective   Vitals:  /68   Wt 79.4 kg (175 lb)   BMI 31.96 kg/m²       Physical Exam  Constitutional:       General: She is not in acute distress.     Appearance: She is not ill-appearing.   Eyes:      Pupils: Pupils are equal, round, and reactive to light.   Cardiovascular:      Rate and Rhythm: Normal rate and regular rhythm.      Pulses: Normal pulses.      Heart sounds: No murmur heard.  Pulmonary:      Effort: No respiratory distress.      Breath sounds: No wheezing.   Abdominal:      General: Abdomen is flat. Bowel sounds are normal. There is no distension.   Musculoskeletal:      Right lower leg: No edema.      Left lower leg: No edema.   Skin:     General: Skin is warm and dry.   Neurological:      Mental Status: She is alert. Mental status is at baseline.      Cranial Nerves: No cranial nerve deficit.      Motor: No " weakness.   Psychiatric:         Mood and Affect: Mood normal.         Behavior: Behavior normal.         Assessment/Plan   Problem List Items Addressed This Visit    None  Visit Diagnoses       Routine general medical examination at health care facility    -  Primary    Lipoma, unspecified site        Mixed hyperlipidemia        Encounter for screening mammogram for breast cancer        Relevant Orders    BI mammo bilateral screening tomosynthesis             72-year-old here on follow-up    Elevated blood pressure reading during colonoscopy  Previously elevated blood pressure readings  Continue on Lasix daily, stable at this time, monitor     History of TIA  -Has followed with neurology as well as cardiology after discovering PFO  -Continue aspirin 81 mg daily  -Continue simvastatin 40 mg daily     Insomnia  Ambien has been discontinued continue current regimen    #Concern for lipomas  Palpated on exam recommended ultrasound of head to  further evaluate     Healthcare maintenance  -Colonoscopy 2/2022, repeat 5-10 years  -Had mammogram done 2021 through [facility]  -Received vaccination for COVID-19, recommended booster  -Recommended Shingrix and Pneumovax     RTC 6 months or earlier as needed     This note was dictated by speech recognition. Minor errors in transcription may be present.

## 2023-12-14 ENCOUNTER — ANCILLARY PROCEDURE (OUTPATIENT)
Dept: RADIOLOGY | Facility: CLINIC | Age: 73
End: 2023-12-14
Payer: MEDICARE

## 2023-12-14 DIAGNOSIS — D17.9 LIPOMA, UNSPECIFIED SITE: ICD-10-CM

## 2023-12-14 DIAGNOSIS — Z12.31 ENCOUNTER FOR SCREENING MAMMOGRAM FOR BREAST CANCER: ICD-10-CM

## 2023-12-14 PROCEDURE — 77067 SCR MAMMO BI INCL CAD: CPT

## 2023-12-14 PROCEDURE — 77063 BREAST TOMOSYNTHESIS BI: CPT

## 2023-12-14 PROCEDURE — 76506 ECHO EXAM OF HEAD: CPT

## 2023-12-14 PROCEDURE — 76506 ECHO EXAM OF HEAD: CPT | Performed by: RADIOLOGY

## 2023-12-14 PROCEDURE — 77067 SCR MAMMO BI INCL CAD: CPT | Performed by: RADIOLOGY

## 2023-12-14 PROCEDURE — 77063 BREAST TOMOSYNTHESIS BI: CPT | Performed by: RADIOLOGY

## 2024-01-12 DIAGNOSIS — D47.9 LYMPHOPROLIFERATIVE DISORDER (MULTI): ICD-10-CM

## 2024-01-15 ENCOUNTER — LAB (OUTPATIENT)
Dept: LAB | Facility: CLINIC | Age: 74
End: 2024-01-15
Payer: MEDICARE

## 2024-01-15 ENCOUNTER — OFFICE VISIT (OUTPATIENT)
Dept: CARDIOLOGY | Facility: CLINIC | Age: 74
End: 2024-01-15
Payer: MEDICARE

## 2024-01-15 VITALS
DIASTOLIC BLOOD PRESSURE: 72 MMHG | SYSTOLIC BLOOD PRESSURE: 116 MMHG | HEART RATE: 63 BPM | WEIGHT: 175.6 LBS | BODY MASS INDEX: 32.07 KG/M2 | OXYGEN SATURATION: 93 %

## 2024-01-15 DIAGNOSIS — D47.9 LYMPHOPROLIFERATIVE DISORDER (MULTI): ICD-10-CM

## 2024-01-15 DIAGNOSIS — R00.2 HEART PALPITATIONS: Primary | ICD-10-CM

## 2024-01-15 DIAGNOSIS — I10 PRIMARY HYPERTENSION: ICD-10-CM

## 2024-01-15 LAB
ALBUMIN SERPL BCP-MCNC: 3.8 G/DL (ref 3.4–5)
ALP SERPL-CCNC: 68 U/L (ref 33–136)
ALT SERPL W P-5'-P-CCNC: 15 U/L (ref 7–45)
ANION GAP SERPL CALC-SCNC: 11 MMOL/L (ref 10–20)
AST SERPL W P-5'-P-CCNC: 17 U/L (ref 9–39)
BASOPHILS # BLD AUTO: 0.1 X10*3/UL (ref 0–0.1)
BASOPHILS NFR BLD AUTO: 1.8 %
BILIRUB SERPL-MCNC: 0.5 MG/DL (ref 0–1.2)
BUN SERPL-MCNC: 20 MG/DL (ref 6–23)
CALCIUM SERPL-MCNC: 9.1 MG/DL (ref 8.6–10.3)
CHLORIDE SERPL-SCNC: 108 MMOL/L (ref 98–107)
CO2 SERPL-SCNC: 25 MMOL/L (ref 21–32)
CREAT SERPL-MCNC: 1.01 MG/DL (ref 0.5–1.05)
EGFRCR SERPLBLD CKD-EPI 2021: 59 ML/MIN/1.73M*2
EOSINOPHIL # BLD AUTO: 0.13 X10*3/UL (ref 0–0.4)
EOSINOPHIL NFR BLD AUTO: 2.4 %
ERYTHROCYTE [DISTWIDTH] IN BLOOD BY AUTOMATED COUNT: 13.2 % (ref 11.5–14.5)
GLUCOSE SERPL-MCNC: 87 MG/DL (ref 74–99)
HCT VFR BLD AUTO: 40.2 % (ref 36–46)
HGB BLD-MCNC: 13.3 G/DL (ref 12–16)
IMM GRANULOCYTES # BLD AUTO: 0.03 X10*3/UL (ref 0–0.5)
IMM GRANULOCYTES NFR BLD AUTO: 0.5 % (ref 0–0.9)
LDH SERPL L TO P-CCNC: 185 U/L (ref 84–246)
LYMPHOCYTES # BLD AUTO: 1.58 X10*3/UL (ref 0.8–3)
LYMPHOCYTES NFR BLD AUTO: 28.7 %
MCH RBC QN AUTO: 28.4 PG (ref 26–34)
MCHC RBC AUTO-ENTMCNC: 33.1 G/DL (ref 32–36)
MCV RBC AUTO: 86 FL (ref 80–100)
MONOCYTES # BLD AUTO: 0.58 X10*3/UL (ref 0.05–0.8)
MONOCYTES NFR BLD AUTO: 10.5 %
NEUTROPHILS # BLD AUTO: 3.08 X10*3/UL (ref 1.6–5.5)
NEUTROPHILS NFR BLD AUTO: 56.1 %
NRBC BLD-RTO: 0 /100 WBCS (ref 0–0)
PLATELET # BLD AUTO: 217 X10*3/UL (ref 150–450)
POTASSIUM SERPL-SCNC: 4.2 MMOL/L (ref 3.5–5.3)
PROT SERPL-MCNC: 6.2 G/DL (ref 6.4–8.2)
RBC # BLD AUTO: 4.68 X10*6/UL (ref 4–5.2)
SODIUM SERPL-SCNC: 140 MMOL/L (ref 136–145)
WBC # BLD AUTO: 5.5 X10*3/UL (ref 4.4–11.3)

## 2024-01-15 PROCEDURE — 1036F TOBACCO NON-USER: CPT | Performed by: PHYSICIAN ASSISTANT

## 2024-01-15 PROCEDURE — 85025 COMPLETE CBC W/AUTO DIFF WBC: CPT

## 2024-01-15 PROCEDURE — 36415 COLL VENOUS BLD VENIPUNCTURE: CPT

## 2024-01-15 PROCEDURE — 1160F RVW MEDS BY RX/DR IN RCRD: CPT | Performed by: PHYSICIAN ASSISTANT

## 2024-01-15 PROCEDURE — 1126F AMNT PAIN NOTED NONE PRSNT: CPT | Performed by: PHYSICIAN ASSISTANT

## 2024-01-15 PROCEDURE — 3078F DIAST BP <80 MM HG: CPT | Performed by: PHYSICIAN ASSISTANT

## 2024-01-15 PROCEDURE — 1159F MED LIST DOCD IN RCRD: CPT | Performed by: PHYSICIAN ASSISTANT

## 2024-01-15 PROCEDURE — 84075 ASSAY ALKALINE PHOSPHATASE: CPT

## 2024-01-15 PROCEDURE — 99214 OFFICE O/P EST MOD 30 MIN: CPT | Performed by: PHYSICIAN ASSISTANT

## 2024-01-15 PROCEDURE — 83615 LACTATE (LD) (LDH) ENZYME: CPT

## 2024-01-15 PROCEDURE — 3074F SYST BP LT 130 MM HG: CPT | Performed by: PHYSICIAN ASSISTANT

## 2024-01-15 NOTE — PROGRESS NOTES
Chief Complaint:   Follow-up (Patient states she is here for a three month follow up)     History Of Present Illness:    Maddie Briseno is a 73 y.o. female presenting for follow up evaluation.  Patient denies chest pain, chest pressure, palpitations, dyspnea on exertion, shortness of breath at rest, diaphoresis, nausea/vomiting, back pain, headache, lightheadedness, dizziness, syncope or presyncopal episodes, active bleeding signs or symptoms, excessive weight gain, muscle or joint pain, claudication.       Last Recorded Vitals:  Vitals:    01/15/24 0918   BP: 116/72   BP Location: Left arm   Patient Position: Sitting   BP Cuff Size: Adult   Pulse: 63   SpO2: 93%   Weight: 79.7 kg (175 lb 9.6 oz)       Past Medical History:  She has a past medical history of Disease of upper respiratory tract, unspecified, Personal history of other diseases of the musculoskeletal system and connective tissue, and Personal history of other endocrine, nutritional and metabolic disease.    Past Surgical History:  She has a past surgical history that includes Other surgical history (2014);  section, classic (2014); Colonoscopy (2015); Back surgery (2015); Other surgical history (10/13/2021); CT angio head w and wo IV contrast (2021); and CT angio neck (2021).      Social History:  She reports that she has never smoked. She has never been exposed to tobacco smoke. She has never used smokeless tobacco. She reports current alcohol use. She reports that she does not use drugs.    Family History:  No family history on file.     Allergies:  Cephalexin    Outpatient Medications:  Current Outpatient Medications   Medication Instructions    aspirin 81 mg chewable tablet 1 tablet, oral, Daily    coenzyme Q-10 100 mg, oral, Daily RT    furosemide (LASIX) 20 mg, oral, Daily    ibuprofen 800 mg tablet 1 tablet, oral, 2 times daily PRN    simvastatin (ZOCOR) 40 mg, oral, Nightly       Physical  Exam:  Constitutional: awake and alert, oriented ×3, no apparent distress  Skin: warm, dry, good turgor no obvious lesions  Eyes: pupils equal, round, reactive to light, conjunctiva pink and noninjected, no discharge  HENT: normocephalic and atraumatic, mucous membranes moist, trachea midline with no masses/goiter  Cardiovascular: S1/S2 regular, no murmur no rubs/gallops, no carotid bruits, no JVD  Pulmonary: symmetrical chest expansion, lungs are clear to auscultation bilaterally, no wheezes/rales/rhonchi, normal effort  Abdomen: nontender, nondistended, active bowel sounds, no ascites  Extremities: no cyanosis, clubbing, no LE edema no lesions; palpable pedal pulses  Neurologic: cranial nerves II - XII grossly intact, stable gait, no tremor       Last Labs:  CBC -  Lab Results   Component Value Date    WBC 6.0 10/26/2023    HGB 13.3 10/26/2023    HCT 40.9 10/26/2023    MCV 85 10/26/2023     10/26/2023       CMP -  Lab Results   Component Value Date    CALCIUM 9.2 10/26/2023    PROT 6.7 10/26/2023    ALBUMIN 4.4 10/26/2023    AST 21 10/26/2023    ALT 17 10/26/2023    ALKPHOS 69 10/26/2023    BILITOT 0.5 10/26/2023       LIPID PANEL -   Lab Results   Component Value Date    CHOL 148 09/26/2021    TRIG 62 09/26/2021    HDL 53.0 09/26/2021    CHHDL 2.8 09/26/2021    LDLF 83 09/26/2021    VLDL 12 09/26/2021       RENAL FUNCTION PANEL -   Lab Results   Component Value Date    GLUCOSE 131 (H) 10/26/2023     10/26/2023    K 3.7 10/26/2023     10/26/2023    CO2 26 10/26/2023    ANIONGAP 13 10/26/2023    BUN 16 10/26/2023    CREATININE 1.07 (H) 10/26/2023    CALCIUM 9.2 10/26/2023    ALBUMIN 4.4 10/26/2023        Lab Results   Component Value Date     (H) 09/26/2021    HGBA1C 5.8 (A) 09/26/2021       Last Cardiology Tests:  ECG:  No results found for this or any previous visit from the past 1095 days.      Echo:  Transthoracic echo (TTE) complete 10/30/2023  1. Left ventricular systolic function is  "normal with a 60-65% estimated ejection fraction.   2. Spectral Doppler shows an impaired relaxation pattern of left ventricular diastolic filling.   3. RVSP within normal limits.    Ejection Fractions:  No results found for: \"EF\"    Cath:  No results found for this or any previous visit from the past 1095 days.      Stress Test:  No results found for this or any previous visit from the past 1095 days.      Cardiac Imaging:  No results found for this or any previous visit from the past 1095 days.      Assessment/Plan   Problem List Items Addressed This Visit             ICD-10-CM       Cardiac and Vasculature    HTN (hypertension) I10    Heart palpitations - Primary R00.2     -F/U in clinic as scheduled    Amador Mota PA-C  "

## 2024-04-15 ENCOUNTER — OFFICE VISIT (OUTPATIENT)
Dept: PRIMARY CARE | Facility: CLINIC | Age: 74
End: 2024-04-15
Payer: MEDICARE

## 2024-04-15 VITALS — BODY MASS INDEX: 31.59 KG/M2 | WEIGHT: 173 LBS | DIASTOLIC BLOOD PRESSURE: 71 MMHG | SYSTOLIC BLOOD PRESSURE: 129 MMHG

## 2024-04-15 DIAGNOSIS — J39.9 UPPER RESPIRATORY DISEASE: Primary | ICD-10-CM

## 2024-04-15 PROBLEM — E78.2 MIXED HYPERLIPIDEMIA: Status: ACTIVE | Noted: 2024-04-15

## 2024-04-15 PROBLEM — R35.0 INCREASED FREQUENCY OF URINATION: Status: ACTIVE | Noted: 2023-08-01

## 2024-04-15 PROBLEM — R42 DIZZY SPELLS: Status: ACTIVE | Noted: 2023-10-17

## 2024-04-15 PROBLEM — S43.409A SPRAIN OF SHOULDER: Status: ACTIVE | Noted: 2024-04-15

## 2024-04-15 PROBLEM — D17.9 LIPOMA: Status: ACTIVE | Noted: 2024-04-15

## 2024-04-15 PROBLEM — R60.0 LOCALIZED EDEMA: Status: ACTIVE | Noted: 2024-04-15

## 2024-04-15 PROBLEM — R25.2 SPASM: Status: ACTIVE | Noted: 2024-04-15

## 2024-04-15 PROBLEM — Z86.39 HISTORY OF HYPERCHOLESTEROLEMIA: Status: ACTIVE | Noted: 2024-04-15

## 2024-04-15 PROBLEM — M25.569 KNEE PAIN: Status: ACTIVE | Noted: 2023-06-13

## 2024-04-15 PROBLEM — G47.9 DIFFICULTY SLEEPING: Status: ACTIVE | Noted: 2024-04-15

## 2024-04-15 PROBLEM — R00.2 PALPITATIONS: Status: ACTIVE | Noted: 2023-10-17

## 2024-04-15 PROBLEM — D47.9 LYMPHOPROLIFERATIVE DISORDER (MULTI): Status: RESOLVED | Noted: 2023-10-23 | Resolved: 2024-04-15

## 2024-04-15 PROBLEM — I10 HYPERTENSION: Status: ACTIVE | Noted: 2023-10-17

## 2024-04-15 PROBLEM — R05.1 ACUTE COUGH: Status: ACTIVE | Noted: 2024-04-15

## 2024-04-15 PROBLEM — Q21.12 PATENT FORAMEN OVALE (HHS-HCC): Status: ACTIVE | Noted: 2023-10-17

## 2024-04-15 PROCEDURE — 99213 OFFICE O/P EST LOW 20 MIN: CPT | Performed by: STUDENT IN AN ORGANIZED HEALTH CARE EDUCATION/TRAINING PROGRAM

## 2024-04-15 PROCEDURE — 1036F TOBACCO NON-USER: CPT | Performed by: STUDENT IN AN ORGANIZED HEALTH CARE EDUCATION/TRAINING PROGRAM

## 2024-04-15 PROCEDURE — 1160F RVW MEDS BY RX/DR IN RCRD: CPT | Performed by: STUDENT IN AN ORGANIZED HEALTH CARE EDUCATION/TRAINING PROGRAM

## 2024-04-15 PROCEDURE — 3074F SYST BP LT 130 MM HG: CPT | Performed by: STUDENT IN AN ORGANIZED HEALTH CARE EDUCATION/TRAINING PROGRAM

## 2024-04-15 PROCEDURE — 3078F DIAST BP <80 MM HG: CPT | Performed by: STUDENT IN AN ORGANIZED HEALTH CARE EDUCATION/TRAINING PROGRAM

## 2024-04-15 PROCEDURE — 1159F MED LIST DOCD IN RCRD: CPT | Performed by: STUDENT IN AN ORGANIZED HEALTH CARE EDUCATION/TRAINING PROGRAM

## 2024-04-15 RX ORDER — AMOXICILLIN AND CLAVULANATE POTASSIUM 875; 125 MG/1; MG/1
875 TABLET, FILM COATED ORAL 2 TIMES DAILY
Qty: 14 TABLET | Refills: 0 | Status: SHIPPED | OUTPATIENT
Start: 2024-04-15 | End: 2024-04-22

## 2024-04-15 NOTE — LETTER
April 15, 2024     Patient: Maddie Briseno   YOB: 1950   Date of Visit: 4/15/2024       To Whom It May Concern:    Maddie Briseno was seen in my clinic on 4/15/2024 at 1:15 pm. Please excuse Maddie for her absence from work on this day to make the appointment.    Please excuse from work 4/15-4/16 due to medical illness. Pt cleared to return to work on 4/17.    If you have any questions or concerns, please don't hesitate to call.         Sincerely,         Carl Melissa,         CC: No Recipients

## 2024-04-15 NOTE — PROGRESS NOTES
Subjective   Maddie Briseno is a 74 y.o. female who is here for URI since over a week. She has been to an urgent care and been prescribed z pack and benzoate which has helped a bit however still having a cough, sore throat, weight on chest, and headaches. She says she's been sleeping a lot too. Denies being around any sick contacts, changes in oral intake, no diarrhea. She says she gets URI every year and has taken Augmentin in the past which has helped. She has not tested for covid, influenza.     Chief Complaint:  URI (1x week with chest congestion)    HPI    ROS    Objective   Physical Exam    General:  Pleasant and cooperative. No apparent distress.  HEENT:  Normocephalic, atraumatic  Chest:  Clear to auscultation. Bilateral and appropriate chest rise  CV:  Regular rate and rhythm.    Abdomen: Abdomen is soft, non-tender, non-distended. BS +   Extremities:  No lower extremity edema or cyanosis.   Neurological:  Conversing and answering questions appropriately   Skin:  Warm and dry.      Lab Review:       Assessment/Plan   The encounter diagnosis was Upper respiratory disease.    #URI  ->1 week, completed z pack persistent symptoms, will trial augmentin    Elevated blood pressure reading during colonoscopy  Previously elevated blood pressure readings  Continue on Lasix daily, stable at this time, monitor     History of TIA  -Has followed with neurology as well as cardiology after discovering PFO  -Continue aspirin 81 mg daily  -Continue simvastatin 40 mg daily     Insomnia  Ambien has been discontinued continue current regimen     #Concern for lipomas  Palpated on exam recommended ultrasound of head to  further evaluate     Healthcare maintenance  -Colonoscopy 2/2022, repeat 5-10 years  -Had mammogram done 2021 through [facility]  -Received vaccination for COVID-19, recommended booster  -Recommended Shingrix and Pneumovax     RTC 6 months or earlier as needed    Pt seen with resident physican, noted continous  URI symptoms, recommend augmentin, chest x-ray if no improvement

## 2024-05-02 ENCOUNTER — APPOINTMENT (OUTPATIENT)
Dept: HEMATOLOGY/ONCOLOGY | Facility: CLINIC | Age: 74
End: 2024-05-02
Payer: MEDICARE

## 2024-05-14 ENCOUNTER — OFFICE VISIT (OUTPATIENT)
Dept: HEMATOLOGY/ONCOLOGY | Facility: CLINIC | Age: 74
End: 2024-05-14
Payer: MEDICARE

## 2024-05-14 ENCOUNTER — LAB (OUTPATIENT)
Dept: LAB | Facility: CLINIC | Age: 74
End: 2024-05-14
Payer: MEDICARE

## 2024-05-14 VITALS
RESPIRATION RATE: 22 BRPM | WEIGHT: 171.96 LBS | DIASTOLIC BLOOD PRESSURE: 67 MMHG | TEMPERATURE: 97.2 F | SYSTOLIC BLOOD PRESSURE: 119 MMHG | OXYGEN SATURATION: 95 % | BODY MASS INDEX: 31.4 KG/M2 | HEART RATE: 74 BPM

## 2024-05-14 DIAGNOSIS — D47.9 LYMPHOPROLIFERATIVE DISORDER (MULTI): ICD-10-CM

## 2024-05-14 DIAGNOSIS — D47.9 LYMPHOPROLIFERATIVE DISORDER (MULTI): Primary | ICD-10-CM

## 2024-05-14 LAB
ALBUMIN SERPL BCP-MCNC: 4.2 G/DL (ref 3.4–5)
ALP SERPL-CCNC: 69 U/L (ref 33–136)
ALT SERPL W P-5'-P-CCNC: 15 U/L (ref 7–45)
ANION GAP SERPL CALC-SCNC: 10 MMOL/L (ref 10–20)
AST SERPL W P-5'-P-CCNC: 17 U/L (ref 9–39)
BASOPHILS # BLD AUTO: 0.08 X10*3/UL (ref 0–0.1)
BASOPHILS NFR BLD AUTO: 1.6 %
BILIRUB SERPL-MCNC: 0.7 MG/DL (ref 0–1.2)
BUN SERPL-MCNC: 16 MG/DL (ref 6–23)
CALCIUM SERPL-MCNC: 9.2 MG/DL (ref 8.6–10.3)
CHLORIDE SERPL-SCNC: 107 MMOL/L (ref 98–107)
CO2 SERPL-SCNC: 24 MMOL/L (ref 21–32)
CREAT SERPL-MCNC: 1.01 MG/DL (ref 0.5–1.05)
EGFRCR SERPLBLD CKD-EPI 2021: 59 ML/MIN/1.73M*2
EOSINOPHIL # BLD AUTO: 0.11 X10*3/UL (ref 0–0.4)
EOSINOPHIL NFR BLD AUTO: 2.2 %
ERYTHROCYTE [DISTWIDTH] IN BLOOD BY AUTOMATED COUNT: 12.9 % (ref 11.5–14.5)
GLUCOSE SERPL-MCNC: 115 MG/DL (ref 74–99)
HCT VFR BLD AUTO: 40.8 % (ref 36–46)
HGB BLD-MCNC: 13.9 G/DL (ref 12–16)
IMM GRANULOCYTES # BLD AUTO: 0.02 X10*3/UL (ref 0–0.5)
IMM GRANULOCYTES NFR BLD AUTO: 0.4 % (ref 0–0.9)
LDH SERPL L TO P-CCNC: 173 U/L (ref 84–246)
LYMPHOCYTES # BLD AUTO: 1.79 X10*3/UL (ref 0.8–3)
LYMPHOCYTES NFR BLD AUTO: 35.2 %
MCH RBC QN AUTO: 29.1 PG (ref 26–34)
MCHC RBC AUTO-ENTMCNC: 34.1 G/DL (ref 32–36)
MCV RBC AUTO: 85 FL (ref 80–100)
MONOCYTES # BLD AUTO: 0.51 X10*3/UL (ref 0.05–0.8)
MONOCYTES NFR BLD AUTO: 10 %
NEUTROPHILS # BLD AUTO: 2.57 X10*3/UL (ref 1.6–5.5)
NEUTROPHILS NFR BLD AUTO: 50.6 %
NRBC BLD-RTO: 0 /100 WBCS (ref 0–0)
PLATELET # BLD AUTO: 211 X10*3/UL (ref 150–450)
POTASSIUM SERPL-SCNC: 3.8 MMOL/L (ref 3.5–5.3)
PROT SERPL-MCNC: 6.9 G/DL (ref 6.4–8.2)
RBC # BLD AUTO: 4.78 X10*6/UL (ref 4–5.2)
SODIUM SERPL-SCNC: 137 MMOL/L (ref 136–145)
WBC # BLD AUTO: 5.1 X10*3/UL (ref 4.4–11.3)

## 2024-05-14 PROCEDURE — 3074F SYST BP LT 130 MM HG: CPT | Performed by: INTERNAL MEDICINE

## 2024-05-14 PROCEDURE — 80053 COMPREHEN METABOLIC PANEL: CPT

## 2024-05-14 PROCEDURE — 83615 LACTATE (LD) (LDH) ENZYME: CPT

## 2024-05-14 PROCEDURE — 99213 OFFICE O/P EST LOW 20 MIN: CPT | Performed by: INTERNAL MEDICINE

## 2024-05-14 PROCEDURE — 1126F AMNT PAIN NOTED NONE PRSNT: CPT | Performed by: INTERNAL MEDICINE

## 2024-05-14 PROCEDURE — 85025 COMPLETE CBC W/AUTO DIFF WBC: CPT

## 2024-05-14 PROCEDURE — 1159F MED LIST DOCD IN RCRD: CPT | Performed by: INTERNAL MEDICINE

## 2024-05-14 PROCEDURE — 1160F RVW MEDS BY RX/DR IN RCRD: CPT | Performed by: INTERNAL MEDICINE

## 2024-05-14 PROCEDURE — 36415 COLL VENOUS BLD VENIPUNCTURE: CPT

## 2024-05-14 PROCEDURE — 3078F DIAST BP <80 MM HG: CPT | Performed by: INTERNAL MEDICINE

## 2024-05-14 ASSESSMENT — PAIN SCALES - GENERAL: PAINLEVEL: 0-NO PAIN

## 2024-05-14 NOTE — PROGRESS NOTES
LOCATION:  [facility] at [facility].     HEMATOLOGY & ONCOLOGY PROBLEMS:  1.  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       a.  Incidental finding on routine colonoscopy in 2022.       b.  Negative staging PET scan.          c.  Currently being followed by close observation.     CHIEF COMPLAINT:    The patient is in the clinic today for management of colonic low-grade non-Hodgkin lymphoma.     HISTORY:   Ms. Briseno is a 74 year old pleasant female with incidental finding of atypical lymphoid infiltrate near the appendiceal orifice on routine colonoscopy in 2022 done by Dr. Alvarez.  Further histochemical and pathology review suggestive of most probable low-grade lymphoproliferative disorder.  Her last  colonoscopy in  was unremarkable.  Clinically she has issues with generalized weakness and fatigue but no other specific localizing signs and symptoms. A staging PET scan was unremarkable other than mild activity at the colon biopsy site.  There was no evidence of visceral lymphadenopathy etc.  She is currently being followed by close observation.     INTERVAL HISTORY:  Denies any specific new complaints.  No history of fever, weight loss or night sweats.  As per the patient she has chronic issues with the benign lipomas over the scalp.      PAST MEDICAL HISTORY:   1.  Hyperlipidemia.   2.  History of TIA.   3.  Chronic insomnia   4.  Degenerative joint disease   5.  History of C-sections, back / wrist and cervical fusion surgeries.     SOCIAL HISTORY:    for 41 years and lives in [place].  Non-smoker.  Rare social alcohol intake.  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  Born and raised in [place].     FAMILY HISTORY:    Father  at age 58 from myocardial infarction.  Mother  at age 92 from lung cancer.  1 brother  at age 72, patient not aware of the medical details.  2 children and 5 grandkids all alive and well. No other  specific history of bleeding, clotting  or malignant disorder in the family.     REVIEW OF SYSTEMS:  Pertinent finding as per the history above.  All other systems have been reviewed and generally negative and noncontributory.     ALLERGY & MEDICATIONS:  Allergies and latest outpatient medications list were reviewed in the EMR.    VITAL SIGNS  BSA: 1.85 meters squared  /67   Pulse 74   Temp 36.2 °C (97.2 °F) (Temporal)   Resp 22   Wt 78 kg (171 lb 15.3 oz)   SpO2 95%   BMI 31.40 kg/m²     PHYSICAL EXAMINATION:  Detailed examination not done.    LAB RESULTS:  CBC and CMP from today is pending but they were all normal in January 2024.  Last 3 sets of blood work were reviewed and the trend was noted.    RADIOLOGY RESULT:  PET/CT Lymphoma Staging [Feb 7 2023  2:47PM]  Impression:  1. Moderately intense focal hypermetabolic activity with associated wall thickening in the distal cecum, unchanged from prior PET-CT and likely corresponding the patient's biopsy-proven low-grade B-cell lymphoma.  2. No new sites of focal hypermetabolic disease to suggest lymphomatous involvement.     PATHOLOGY RESULTS:  Specimens: [facility], [id number] ( 2/8/2022)   Name [facility]   Accession #: [id number]   Pathologist: FANY MORELAND JR, MD, PhD.   Date of Procedure: 6/20/2022    Submitting Physician: PASTORA BENITEZ MD   Location: [facility] External # [id number]   FINAL DIAGNOSIS   A. COLON, BIOPSY([facility],  (2/8/2022)):   -- LOW GRADE B CELL LYMPHOMA, FAVOR EXTRANODAL MARGINAL ZONE LYMPHOMA.    MORPHOLOGY: The specimen consist of several fragments of colonic mucosa effaced by sheets of small lymphoid cells with slightly irregular nuclear contour and scant cytoplasm. A focal lymphoepithelial lesion is seen.   IMMUNOHISTOCHEMISTRY:   Immunohistochemical  stains performed at the outside institution and reviewed at [facility] reveal the atypical lymphoid infiltrate is   CD20: Positive   BCL2:  Positive   CD3 & CD5: Negative. Highlights background T cells.   Cyclin D1: Negative   CD10: Negative.  Highlights few residual germinal centers.   CD43: Negative. Highlights background T cells and plasma cells   No meshwork stains was performed. No kappa/lambda staining to evaluate for plasma cell light chain restriction was performed. Ki67 proliferation  index was not assesed.   MOLECULAR STUDIES: Per report molecular studies was positive for a clonal IgH and Ig kappa rearrangement.   CYTOGENETICS: FISH performed and interpreted at outside institution, ([facility], [place], [place]), was reported to be negative for BIRC-MALT1 fusion.   The gross and/or microscopic findings were reviewed in conjunction with pathology resident, BROOKLYN Lott.   Electronically Signed Out By FANY MORELAND JR, MD,  PhD./DODIE      ASSESSMENT AND PLAN:   1.  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 Please refer to the details of initial presentation and management as outlined above. In  summary, patient with incidental finding of low-grade B-cell lymphoproliferative disorder involving the colon on routine colonoscopy in Feb 2022.  Clinically she has issues with generalized anxiety and weakness but no specific history of night sweats,  weight loss or fever. After initial hematology oncology evaluation a follow-up PET scan was essentially unremarkable.     Again, long discussion with the patient and her  and they were explained about the pathology reports and the F/U PET scan findings.  She had a very isolated foci of low-grade lymphoproliferative lesion in the colon.  There is no evidence of  locoregional lymphadenopathy or metastatic disease.  Clinically she is essentially asymptomatic and follow-up blood work has been unremarkable.  She is currently being followed by close observation.  I advised her to follow-up with the GI for probable repeat colonoscopy next  year, for be 3-year follow-up study.     2. Follow up:  Patient will return to the clinic in 6 months.  Advised to contact us immediately if there are any new questions or problems.     This note has been transcribed using Dragon voice recognition system and there is a possibility of unintentional typing misprints.

## 2024-06-20 ENCOUNTER — APPOINTMENT (OUTPATIENT)
Dept: ORTHOPEDIC SURGERY | Facility: CLINIC | Age: 74
End: 2024-06-20
Payer: MEDICARE

## 2024-07-07 DIAGNOSIS — E78.2 MIXED HYPERLIPIDEMIA: ICD-10-CM

## 2024-07-09 RX ORDER — SIMVASTATIN 40 MG/1
40 TABLET, FILM COATED ORAL NIGHTLY
Qty: 90 TABLET | Refills: 0 | Status: SHIPPED | OUTPATIENT
Start: 2024-07-09

## 2024-10-03 ENCOUNTER — OFFICE VISIT (OUTPATIENT)
Dept: ORTHOPEDIC SURGERY | Facility: CLINIC | Age: 74
End: 2024-10-03
Payer: MEDICARE

## 2024-10-03 ENCOUNTER — HOSPITAL ENCOUNTER (OUTPATIENT)
Dept: RADIOLOGY | Facility: EXTERNAL LOCATION | Age: 74
Discharge: HOME | End: 2024-10-03

## 2024-10-03 DIAGNOSIS — G89.29 CHRONIC PAIN OF LEFT KNEE: ICD-10-CM

## 2024-10-03 DIAGNOSIS — M17.12 PRIMARY OSTEOARTHRITIS OF LEFT KNEE: Primary | ICD-10-CM

## 2024-10-03 DIAGNOSIS — M25.562 CHRONIC PAIN OF LEFT KNEE: ICD-10-CM

## 2024-10-03 PROCEDURE — 1160F RVW MEDS BY RX/DR IN RCRD: CPT | Performed by: FAMILY MEDICINE

## 2024-10-03 PROCEDURE — 99213 OFFICE O/P EST LOW 20 MIN: CPT | Performed by: FAMILY MEDICINE

## 2024-10-03 PROCEDURE — 1036F TOBACCO NON-USER: CPT | Performed by: FAMILY MEDICINE

## 2024-10-03 PROCEDURE — 20611 DRAIN/INJ JOINT/BURSA W/US: CPT | Performed by: FAMILY MEDICINE

## 2024-10-03 PROCEDURE — 1159F MED LIST DOCD IN RCRD: CPT | Performed by: FAMILY MEDICINE

## 2024-10-03 RX ORDER — TRIAMCINOLONE ACETONIDE 40 MG/ML
40 INJECTION, SUSPENSION INTRA-ARTICULAR; INTRAMUSCULAR
Status: COMPLETED | OUTPATIENT
Start: 2024-10-03 | End: 2024-10-03

## 2024-10-03 RX ORDER — LIDOCAINE HYDROCHLORIDE 20 MG/ML
2 INJECTION, SOLUTION INFILTRATION; PERINEURAL
Status: COMPLETED | OUTPATIENT
Start: 2024-10-03 | End: 2024-10-03

## 2024-10-03 NOTE — PROGRESS NOTES
History of Present Illness   Chief Complaint   Patient presents with    Left Knee - Follow-up     FUV L KNEE PAIN LAST SEEN 6/12/24 FOR MRI REFERRAL  MRI DONE 6/13/24       The patient is 74 y.o. female  here with a complaint of left knee pain.  Patient has some known osteoarthritis of her left knee, she was last seen by me in June 2023, she was having some more notable discomfort in her knee after injection with some bony tenderness, MRI showed insufficiency fracture of the medial tibial plateau, also showed mild degenerative changes of the medial compartment, severe patellofemoral osteoarthritis.  Instructed to minimize weightbearing and rest and she had good improvement in symptoms and been doing well until recently.  She is going to [place] for her 43rd wedding anniversary, plans to do a good amount of walking, was hoping that another injection today would be of benefit.  Pain mostly over the medial aspect of her knee.  Pain is worse with walking, standing, stairs, better with rest.  She uses over-the-counter medications as needed.    Past Medical History:   Diagnosis Date    Disease of upper respiratory tract, unspecified     Upper respiratory disease    Personal history of other diseases of the musculoskeletal system and connective tissue     History of arthritis    Personal history of other endocrine, nutritional and metabolic disease     History of hypercholesterolemia       Medication Documentation Review Audit       Reviewed by Margi Morales MA (Medical Assistant) on 05/14/24 at 0936      Medication Order Taking? Sig Documenting Provider Last Dose Status   aspirin 81 mg chewable tablet [id number] Yes Chew 1 tablet (81 mg) once daily. Historical Provider, MD Taking Active   coenzyme Q-10 100 mg capsule [id number] Yes Take 1 capsule (100 mg) by mouth once daily. Historical Provider, MD Taking Active   ibuprofen 800 mg tablet [id number] Yes Take 1 tablet (800 mg) by mouth 2 times a day as needed. Historical  Provider, MD Taking Active   simvastatin (Zocor) 40 mg tablet [id number] Yes Take 1 tablet (40 mg) by mouth once daily at bedtime. Carl Melissa, DO Taking Active                    Allergies   Allergen Reactions    Cephalexin Rash       Social History     Socioeconomic History    Marital status:      Spouse name: Not on file    Number of children: Not on file    Years of education: Not on file    Highest education level: Not on file   Occupational History    Not on file   Tobacco Use    Smoking status: Never     Passive exposure: Never    Smokeless tobacco: Never   Substance and Sexual Activity    Alcohol use: Yes     Comment: social    Drug use: Never    Sexual activity: Not on file   Other Topics Concern    Not on file   Social History Narrative    Not on file     Social Determinants of Health     Financial Resource Strain: Not on file   Food Insecurity: Not on file   Transportation Needs: Not on file   Physical Activity: Not on file   Stress: Not on file   Social Connections: Not on file   Intimate Partner Violence: Not on file   Housing Stability: Not on file       Past Surgical History:   Procedure Laterality Date    BACK SURGERY  2015    Back Surgery     SECTION, CLASSIC  2014     Section    COLONOSCOPY  2015    Colonoscopy (Fiberoptic)    CT ANGIO NECK  2021    CT NECK ANGIO W AND WO IV CONTRAST [facility]    CT HEAD ANGIO W AND WO IV CONTRAST  2021    CT HEAD ANGIO W AND WO IV CONTRAST 2021 [facility]    OTHER SURGICAL HISTORY  2014    Exploration Of Spinal Fusion    OTHER SURGICAL HISTORY  10/13/2021    Wrist fracture repair          Review of Systems   GENERAL: Negative  GI: Negative  MUSCULOSKELETAL: See HPI  SKIN: Negative  NEURO:  Negative     Physical Exam:    General/Constitutional: well appearing, no distress, appears stated age  HEENT: sclera clear  Respiratory: non labored breathing  Vascular: No edema,  swelling or tenderness, except as noted in detailed exam.  Integumentary: No impressive skin lesions present, except as noted in detailed exam.  Neurological:  Alert and oriented   Psychological:  Normal mood and affect.  Musculoskeletal: Normal, except as noted in detailed exam and in HPI mildly antalgic gait, unassisted     left knee: Normal appearance, no skin changes.  There is a trace joint effusion.  She has some medial joint tenderness, there is some mild tenderness at the medial tibia.  Range of motion from 0 to 120 degrees with some discomfort endrange of flexion.  No motor deficits present, no pain with strength testing.  Negative Griffin, negative Lachman, negative posterior drawer.  Stable to varus and valgus stress.       Imaging: No new imaging today.      L Inj/Asp: L knee on 10/3/2024 3:00 PM  Indications: pain  Details: 22 G needle, ultrasound-guided superolateral approach  Medications: 40 mg triamcinolone acetonide 40 mg/mL; 2 mL lidocaine 20 mg/mL (2 %)  Outcome: tolerated well, no immediate complications  Procedure, treatment alternatives, risks and benefits explained, specific risks discussed. Consent was given by the patient. Immediately prior to procedure a time out was called to verify the correct patient, procedure, equipment, support staff and site/side marked as required. Patient was prepped and draped in the usual sterile fashion.               Assessment   1. Primary osteoarthritis of left knee        2. Chronic pain of left knee  Point of Care Ultrasound            Plan: Recurrent left knee pain, symptoms thought to be secondary to underlying osteoarthritis.  We discussed further workup and treatment.  Decision was made to proceed with ultrasound-guided left knee joint injection with Kenalog, patient tolerated procedure without issue.  Hopefully she will see notable improvement over the next week or so.  Discussed importance of maintaining active lifestyle and healthy weight, she is  currently working with , she will take a few days off from this and then back to activity as tolerated.  She will follow-up as symptoms dictate.

## 2024-10-07 ENCOUNTER — APPOINTMENT (OUTPATIENT)
Dept: PRIMARY CARE | Facility: CLINIC | Age: 74
End: 2024-10-07
Payer: MEDICARE

## 2024-10-07 ENCOUNTER — LAB (OUTPATIENT)
Dept: LAB | Facility: LAB | Age: 74
End: 2024-10-07
Payer: MEDICARE

## 2024-10-07 VITALS — WEIGHT: 167 LBS | BODY MASS INDEX: 30.5 KG/M2 | SYSTOLIC BLOOD PRESSURE: 122 MMHG | DIASTOLIC BLOOD PRESSURE: 60 MMHG

## 2024-10-07 DIAGNOSIS — E78.2 MIXED HYPERLIPIDEMIA: Primary | ICD-10-CM

## 2024-10-07 DIAGNOSIS — R73.9 HYPERGLYCEMIA: ICD-10-CM

## 2024-10-07 DIAGNOSIS — E78.2 MIXED HYPERLIPIDEMIA: ICD-10-CM

## 2024-10-07 DIAGNOSIS — G47.00 INSOMNIA, UNSPECIFIED TYPE: ICD-10-CM

## 2024-10-07 LAB
CHOLEST SERPL-MCNC: 230 MG/DL (ref 0–199)
CHOLESTEROL/HDL RATIO: 5.3
EST. AVERAGE GLUCOSE BLD GHB EST-MCNC: 108 MG/DL
HBA1C MFR BLD: 5.4 %
HDLC SERPL-MCNC: 43.1 MG/DL
LDLC SERPL CALC-MCNC: 136 MG/DL
NON HDL CHOLESTEROL: 187 MG/DL (ref 0–149)
TRIGL SERPL-MCNC: 257 MG/DL (ref 0–149)
TSH SERPL-ACNC: 2.19 MIU/L (ref 0.44–3.98)
VLDL: 51 MG/DL (ref 0–40)

## 2024-10-07 PROCEDURE — G2211 COMPLEX E/M VISIT ADD ON: HCPCS | Performed by: STUDENT IN AN ORGANIZED HEALTH CARE EDUCATION/TRAINING PROGRAM

## 2024-10-07 PROCEDURE — 80061 LIPID PANEL: CPT

## 2024-10-07 PROCEDURE — 3078F DIAST BP <80 MM HG: CPT | Performed by: STUDENT IN AN ORGANIZED HEALTH CARE EDUCATION/TRAINING PROGRAM

## 2024-10-07 PROCEDURE — 36415 COLL VENOUS BLD VENIPUNCTURE: CPT

## 2024-10-07 PROCEDURE — 99214 OFFICE O/P EST MOD 30 MIN: CPT | Performed by: STUDENT IN AN ORGANIZED HEALTH CARE EDUCATION/TRAINING PROGRAM

## 2024-10-07 PROCEDURE — 1036F TOBACCO NON-USER: CPT | Performed by: STUDENT IN AN ORGANIZED HEALTH CARE EDUCATION/TRAINING PROGRAM

## 2024-10-07 PROCEDURE — 3074F SYST BP LT 130 MM HG: CPT | Performed by: STUDENT IN AN ORGANIZED HEALTH CARE EDUCATION/TRAINING PROGRAM

## 2024-10-07 PROCEDURE — 83036 HEMOGLOBIN GLYCOSYLATED A1C: CPT

## 2024-10-07 PROCEDURE — 1160F RVW MEDS BY RX/DR IN RCRD: CPT | Performed by: STUDENT IN AN ORGANIZED HEALTH CARE EDUCATION/TRAINING PROGRAM

## 2024-10-07 PROCEDURE — 84443 ASSAY THYROID STIM HORMONE: CPT

## 2024-10-07 PROCEDURE — 1159F MED LIST DOCD IN RCRD: CPT | Performed by: STUDENT IN AN ORGANIZED HEALTH CARE EDUCATION/TRAINING PROGRAM

## 2024-10-07 RX ORDER — TRAZODONE HYDROCHLORIDE 50 MG/1
50 TABLET ORAL NIGHTLY PRN
Qty: 90 TABLET | Refills: 1 | Status: SHIPPED | OUTPATIENT
Start: 2024-10-07 | End: 2025-04-05

## 2024-10-07 ASSESSMENT — ENCOUNTER SYMPTOMS
NEUROLOGICAL NEGATIVE: 1
ENDOCRINE NEGATIVE: 1
EYES NEGATIVE: 1
MUSCULOSKELETAL NEGATIVE: 1
GASTROINTESTINAL NEGATIVE: 1
CONSTITUTIONAL NEGATIVE: 1
RESPIRATORY NEGATIVE: 1
CARDIOVASCULAR NEGATIVE: 1
PSYCHIATRIC NEGATIVE: 1

## 2024-10-07 NOTE — PROGRESS NOTES
Subjective   Maddie Briseno is a 74 y.o. female with previous medical history of TIA, PFO and insomnia. She came in for routine follow up.     Chief Complaint:  Came in for a routine follow up    HPI  Maddie Briseno is a 74 y.o. female with previous medical history of TIA, PFO and insomnia. She came in for routine follow up. Patient stated she stopped taking simvastatin 2 months ago after hearing from friends about simvastatin causing dementia. She stated that her mother had dementia and do not want to have it because of the simvastatin. Patient also stated that she has been having insomnia for a while. Otherwise she denied chest pain, headache, shortness of breath, fever, cough, nausea or vomiting.    Review of Systems   Constitutional: Negative.   HENT: Negative.     Eyes: Negative.    Cardiovascular: Negative.    Respiratory: Negative.     Endocrine: Negative.    Skin: Negative.    Musculoskeletal: Negative.    Gastrointestinal: Negative.    Genitourinary: Negative.    Neurological: Negative.    Psychiatric/Behavioral: Negative.         Objective   Physical Exam    General:  Pleasant and cooperative. No apparent distress.  HEENT:  Normocephalic, atraumatic  Chest:  Clear to auscultation. Bilateral and appropriate chest rise  CV:  Regular rate and rhythm.    Abdomen: Abdomen is soft, non-tender, non-distended. BS +   Extremities:  No lower extremity edema or cyanosis.   Neurological:  Conversing and answering questions appropriately   Skin:  Warm and dry.      Lab Review:       Assessment/Plan   The primary encounter diagnosis was Mixed hyperlipidemia. Diagnoses of Insomnia, unspecified type and Hyperglycemia were also pertinent to this visit.         History of TIA  -Has followed with neurology as well as cardiology after discovering PFO  -Continue aspirin 81 mg daily  - Patient stopped taking simvastatin because of concern for dementia     Insomnia  Ambien has been discontinued continue current regimen        Healthcare maintenance  -Colonoscopy 2/2022, repeat 5-10 years  -Had mammogram done 2021 through [facility] 6 months or earlier as needed    Patient seen with attending physician  Feliz Guzman MD  [facility]/ PGY 1 IM resident.    Patient seen in conjunction with resident physician, recommend initiation of trazodone due to underlying insomnia.  Continue on all her medications will follow-up with lipid panel to see if patient warrants therapy with cholesterol medicine.  Otherwise obtain TSH, discussed benefits of GLP-1, patient is overall losing weight on her own, will continue with lifestyle modifications rather than medical intervention due to side effect profile.  Otherwise, continue supportive care

## 2024-10-23 ENCOUNTER — TELEMEDICINE (OUTPATIENT)
Dept: PRIMARY CARE | Facility: CLINIC | Age: 74
End: 2024-10-23
Payer: MEDICARE

## 2024-10-23 DIAGNOSIS — I10 PRIMARY HYPERTENSION: ICD-10-CM

## 2024-10-23 DIAGNOSIS — B96.89 ACUTE BACTERIAL BRONCHITIS: Primary | ICD-10-CM

## 2024-10-23 DIAGNOSIS — N18.31 CHRONIC KIDNEY DISEASE, STAGE 3A (MULTI): ICD-10-CM

## 2024-10-23 DIAGNOSIS — J20.8 ACUTE BACTERIAL BRONCHITIS: Primary | ICD-10-CM

## 2024-10-23 PROCEDURE — 1159F MED LIST DOCD IN RCRD: CPT | Performed by: INTERNAL MEDICINE

## 2024-10-23 PROCEDURE — 1036F TOBACCO NON-USER: CPT | Performed by: INTERNAL MEDICINE

## 2024-10-23 PROCEDURE — 1123F ACP DISCUSS/DSCN MKR DOCD: CPT | Performed by: INTERNAL MEDICINE

## 2024-10-23 PROCEDURE — 1160F RVW MEDS BY RX/DR IN RCRD: CPT | Performed by: INTERNAL MEDICINE

## 2024-10-23 PROCEDURE — 99442 PR PHYS/QHP TELEPHONE EVALUATION 11-20 MIN: CPT | Performed by: INTERNAL MEDICINE

## 2024-10-23 PROCEDURE — 1158F ADVNC CARE PLAN TLK DOCD: CPT | Performed by: INTERNAL MEDICINE

## 2024-10-23 RX ORDER — AZITHROMYCIN 250 MG/1
TABLET, FILM COATED ORAL
Qty: 6 TABLET | Refills: 2 | Status: SHIPPED | OUTPATIENT
Start: 2024-10-23 | End: 2024-10-28

## 2024-10-23 ASSESSMENT — PATIENT HEALTH QUESTIONNAIRE - PHQ9
2. FEELING DOWN, DEPRESSED OR HOPELESS: NOT AT ALL
1. LITTLE INTEREST OR PLEASURE IN DOING THINGS: NOT AT ALL
SUM OF ALL RESPONSES TO PHQ9 QUESTIONS 1 AND 2: 0

## 2024-10-23 NOTE — LETTER
October 23, 2024     Patient: Maddie Briseno   YOB: 1950   Date of Visit: 10/23/2024       To Whom It May Concern:    Maddie Briseno was seen in my clinic on 10/23/2024 at 11:45 am. Please excuse Maddie for her absence from work should be off work for medical reasons until 10/28/24, she can return on that date if feeling better without restrictions.    If you have any questions or concerns, please don't hesitate to call.         Sincerely,         Jeremias Stearns DO        CC: No Recipients

## 2024-10-23 NOTE — PROGRESS NOTES
Subjective   Patient ID: Maddie Briseno is a 74 y.o. female who presents for sick (When blows nose, can be bloody, coughing and sweating) and discuss statin (Stopped taking medication).  HPI  Past medical history osteoarthritis hyperlipidemia    Patient describes sinus congestion persistent for the past week grade 8 out of 10 seems worse after she traveled to [place] for a week was on multiple planes nothing makes better tried cold and flu Tylenol she states she gets a bacterial type URI every October  Intermittent fever  Fatigue achy  Denies chest pain dyspnea nausea vomiting fever chills otalgia otorrhea odynophagia dysphagia              Health Maintenance:      Colonoscopy:      Mammogram:      Pelvic/Pap:      Low dose chest CT:      Aorta duplex:      Optho:      Podiatry:        Vaccines:      Refer to Epic Vaccination Log        ROS:      General: Fatigue intermittent fever denies fever/chills/weight loss      Head: Sinus congestion denies HA/trauma/masses/dizziness      Eyes: denies vision change/loss of vision/blurry vision/diplopia/eye pain      Ears: denies hearing loss/tinnitus/otalgia/otorrhea      Nose: denies nasal drainage/anosmia      Throat: denies dysphagia/odynophagia      Lymphatics: denies lymph node swelling      Breast: denies masses/discharge/dimpling/skin changes      Cardiac: denies CP/palpitations/orthopnea/PND      Pulmonary: denies dyspnea/cough/wheezing      GI: denies abd pain/n/v/diarrhea/melena/hematochezia/hematemesis      : denies dysuria/hematuria/change frequency      Genital: denies genital discharge/lesions      Skin: denies rashes/lesions/masses      MSK: denies weakness/swelling/edema/gait imbalance/pain      Neuro: denies paresthesias/seizures/dysarthria      Psych: denies depression/anxiety/suicidal or homicidal ideations            Objective   There were no vitals taken for this visit.     Physical Exam:     General: AO3, NAD     Speaking in full sentences  "nonlabored  Appropriate mood and affect                  No results found for: \"BMPR1A\", \"CBCDIF\"    13 minutes time spent evaluating symptoms plan of care  Assessment/Plan   Diagnoses and all orders for this visit:  Acute bacterial bronchitis  Comments:  Less likely superimposed COVID viral syndrome  Orders:  -     azithromycin (Zithromax) 250 mg tablet; Take 2 tablets (500 mg) by mouth once daily for 1 day, THEN 1 tablet (250 mg) once daily for 4 days. Take 2 tabs (500 mg) by mouth today, than 1 daily for 4 days..  Primary hypertension  Chronic kidney disease, stage 3a (Multi)    Go to the ER for chest pain shortness of breath or other concerning symptoms  Home COVID testing  Increase clear fluids rest  Work note written to be off until October 28  Steam from the shower    Thank you for making a point today Maddie    Please follow-up in 3 months with Dr. Melissa or myself    Jeremias Stearns DO, CHHAYA Stearns DO  "

## 2024-11-05 ENCOUNTER — OFFICE VISIT (OUTPATIENT)
Dept: PRIMARY CARE | Facility: CLINIC | Age: 74
End: 2024-11-05
Payer: MEDICARE

## 2024-11-05 VITALS — DIASTOLIC BLOOD PRESSURE: 60 MMHG | BODY MASS INDEX: 30.13 KG/M2 | SYSTOLIC BLOOD PRESSURE: 120 MMHG | WEIGHT: 165 LBS

## 2024-11-05 DIAGNOSIS — G93.32 MYALGIC ENCEPHALOMYELITIS/CHRONIC FATIGUE SYNDROME (ME/CFS): ICD-10-CM

## 2024-11-05 DIAGNOSIS — J18.9 PNEUMONIA DUE TO INFECTIOUS ORGANISM, UNSPECIFIED LATERALITY, UNSPECIFIED PART OF LUNG: Primary | ICD-10-CM

## 2024-11-05 PROCEDURE — 1159F MED LIST DOCD IN RCRD: CPT | Performed by: STUDENT IN AN ORGANIZED HEALTH CARE EDUCATION/TRAINING PROGRAM

## 2024-11-05 PROCEDURE — 99214 OFFICE O/P EST MOD 30 MIN: CPT | Performed by: STUDENT IN AN ORGANIZED HEALTH CARE EDUCATION/TRAINING PROGRAM

## 2024-11-05 PROCEDURE — 1036F TOBACCO NON-USER: CPT | Performed by: STUDENT IN AN ORGANIZED HEALTH CARE EDUCATION/TRAINING PROGRAM

## 2024-11-05 PROCEDURE — 1160F RVW MEDS BY RX/DR IN RCRD: CPT | Performed by: STUDENT IN AN ORGANIZED HEALTH CARE EDUCATION/TRAINING PROGRAM

## 2024-11-05 PROCEDURE — 3074F SYST BP LT 130 MM HG: CPT | Performed by: STUDENT IN AN ORGANIZED HEALTH CARE EDUCATION/TRAINING PROGRAM

## 2024-11-05 PROCEDURE — 96372 THER/PROPH/DIAG INJ SC/IM: CPT | Performed by: STUDENT IN AN ORGANIZED HEALTH CARE EDUCATION/TRAINING PROGRAM

## 2024-11-05 PROCEDURE — 3078F DIAST BP <80 MM HG: CPT | Performed by: STUDENT IN AN ORGANIZED HEALTH CARE EDUCATION/TRAINING PROGRAM

## 2024-11-05 RX ORDER — DOXYCYCLINE 100 MG/1
100 CAPSULE ORAL 2 TIMES DAILY
Qty: 14 CAPSULE | Refills: 0 | Status: SHIPPED | OUTPATIENT
Start: 2024-11-05 | End: 2024-11-12

## 2024-11-05 RX ORDER — CYANOCOBALAMIN 1000 UG/ML
1000 INJECTION, SOLUTION INTRAMUSCULAR; SUBCUTANEOUS ONCE
Status: COMPLETED | OUTPATIENT
Start: 2024-11-05 | End: 2024-11-05

## 2024-11-05 RX ORDER — AMOXICILLIN AND CLAVULANATE POTASSIUM 875; 125 MG/1; MG/1
875 TABLET, FILM COATED ORAL 2 TIMES DAILY
Qty: 14 TABLET | Refills: 0 | Status: SHIPPED | OUTPATIENT
Start: 2024-11-05 | End: 2024-11-12

## 2024-11-05 ASSESSMENT — PATIENT HEALTH QUESTIONNAIRE - PHQ9
2. FEELING DOWN, DEPRESSED OR HOPELESS: NOT AT ALL
SUM OF ALL RESPONSES TO PHQ9 QUESTIONS 1 AND 2: 0
1. LITTLE INTEREST OR PLEASURE IN DOING THINGS: NOT AT ALL

## 2024-11-05 ASSESSMENT — ENCOUNTER SYMPTOMS
CARDIOVASCULAR NEGATIVE: 1
RESPIRATORY NEGATIVE: 1
PSYCHIATRIC NEGATIVE: 1
WEAKNESS: 1
FATIGUE: 1
MUSCULOSKELETAL NEGATIVE: 1
GASTROINTESTINAL NEGATIVE: 1
SINUS PRESSURE: 1

## 2024-11-05 NOTE — PROGRESS NOTES
Alanis min [id number] in office visit    Subjective   Patient ID: Maddie Briseno is a 74 y.o. female.    Patient seen on sick visit.  She regards that she is overall feeling okay.  Has been having worsening issues with fatigue as well as congestion.  Recently went on a trip to [place], and states that she started feeling sick during this trip.  Otherwise, did have a recent virtual appointment where she was prescribed Z-Aleks but regarding her symptoms have not improved.  Feels more lethargic than usual.  Otherwise, states no additional issues or concerns at this time.  Is not having any fevers.        Review of Systems   Constitutional:  Positive for fatigue.   HENT:  Positive for congestion and sinus pressure.    Respiratory: Negative.     Cardiovascular: Negative.    Gastrointestinal: Negative.    Musculoskeletal: Negative.    Neurological:  Positive for weakness.   Psychiatric/Behavioral: Negative.         Objective Visit Vitals  /60   Wt 74.8 kg (165 lb)   BMI 30.13 kg/m²   Smoking Status Never   BSA 1.81 m²      Physical Exam  Constitutional:       General: She is not in acute distress.     Appearance: She is not ill-appearing.   Eyes:      Pupils: Pupils are equal, round, and reactive to light.      Comments: Cough and congestion   Cardiovascular:      Rate and Rhythm: Normal rate and regular rhythm.      Pulses: Normal pulses.      Heart sounds: No murmur heard.  Pulmonary:      Effort: No respiratory distress.      Breath sounds: No wheezing.   Abdominal:      General: Abdomen is flat. Bowel sounds are normal. There is no distension.   Musculoskeletal:      Right lower leg: No edema.      Left lower leg: No edema.   Skin:     General: Skin is warm and dry.   Neurological:      Mental Status: She is alert. Mental status is at baseline.      Cranial Nerves: No cranial nerve deficit.      Motor: Weakness present.   Psychiatric:         Mood and Affect: Mood normal.         Behavior: Behavior normal.          Assessment/Plan   Diagnoses and all orders for this visit:  Pneumonia due to infectious organism, unspecified laterality, unspecified part of lung  -     XR chest 2 views; Future  -     amoxicillin-pot clavulanate (Augmentin) 875-125 mg tablet; Take 1 tablet (875 mg) by mouth 2 times a day for 7 days.  -     doxycycline (Vibramycin) 100 mg capsule; Take 1 capsule (100 mg) by mouth 2 times a day for 7 days. Take with at least 8 ounces (large glass) of water, do not lie down for 30 minutes after  Myalgic encephalomyelitis/chronic fatigue syndrome (ME/CFS)  -     cyanocobalamin (Vitamin B-12) injection 1,000 mcg  Patient seen on sick visit    #Concern for pneumonia  Patient having persistent sinus infections of pneumonia, recommend course of Augmentin and doxycycline chest x-ray today, consider CT if symptoms continue to not improve.  Continue over-the-counter medications.  Will call if symptoms worsen after    #Fatigue  Could be multifactorial, B12 shot today    Return to care as previous scheduled or as needed

## 2024-11-06 DIAGNOSIS — E78.2 MIXED HYPERLIPIDEMIA: ICD-10-CM

## 2024-11-06 RX ORDER — SIMVASTATIN 40 MG/1
40 TABLET, FILM COATED ORAL NIGHTLY
Qty: 90 TABLET | Refills: 1 | Status: SHIPPED | OUTPATIENT
Start: 2024-11-06

## 2024-11-12 ENCOUNTER — OFFICE VISIT (OUTPATIENT)
Dept: HEMATOLOGY/ONCOLOGY | Facility: CLINIC | Age: 74
End: 2024-11-12
Payer: MEDICARE

## 2024-11-12 ENCOUNTER — APPOINTMENT (OUTPATIENT)
Dept: LAB | Facility: CLINIC | Age: 74
End: 2024-11-12
Payer: MEDICARE

## 2024-11-12 VITALS
HEART RATE: 60 BPM | TEMPERATURE: 97.5 F | OXYGEN SATURATION: 97 % | BODY MASS INDEX: 29.39 KG/M2 | DIASTOLIC BLOOD PRESSURE: 71 MMHG | WEIGHT: 160.94 LBS | SYSTOLIC BLOOD PRESSURE: 134 MMHG | RESPIRATION RATE: 20 BRPM

## 2024-11-12 DIAGNOSIS — D47.9 LYMPHOPROLIFERATIVE DISORDER (MULTI): Primary | ICD-10-CM

## 2024-11-12 LAB
ALBUMIN SERPL BCP-MCNC: 3.9 G/DL (ref 3.4–5)
ALP SERPL-CCNC: 56 U/L (ref 33–136)
ALT SERPL W P-5'-P-CCNC: 17 U/L (ref 7–45)
ANION GAP SERPL CALC-SCNC: 10 MMOL/L (ref 10–20)
AST SERPL W P-5'-P-CCNC: 18 U/L (ref 9–39)
BASOPHILS # BLD AUTO: 0.07 X10*3/UL (ref 0–0.1)
BASOPHILS NFR BLD AUTO: 1.7 %
BILIRUB SERPL-MCNC: 0.7 MG/DL (ref 0–1.2)
BUN SERPL-MCNC: 14 MG/DL (ref 6–23)
CALCIUM SERPL-MCNC: 9.1 MG/DL (ref 8.6–10.3)
CHLORIDE SERPL-SCNC: 106 MMOL/L (ref 98–107)
CO2 SERPL-SCNC: 27 MMOL/L (ref 21–32)
CREAT SERPL-MCNC: 1 MG/DL (ref 0.5–1.05)
EGFRCR SERPLBLD CKD-EPI 2021: 60 ML/MIN/1.73M*2
EOSINOPHIL # BLD AUTO: 0.06 X10*3/UL (ref 0–0.4)
EOSINOPHIL NFR BLD AUTO: 1.4 %
ERYTHROCYTE [DISTWIDTH] IN BLOOD BY AUTOMATED COUNT: 13.2 % (ref 11.5–14.5)
GLUCOSE SERPL-MCNC: 89 MG/DL (ref 74–99)
HCT VFR BLD AUTO: 40.9 % (ref 36–46)
HGB BLD-MCNC: 13.2 G/DL (ref 12–16)
IMM GRANULOCYTES # BLD AUTO: 0.01 X10*3/UL (ref 0–0.5)
IMM GRANULOCYTES NFR BLD AUTO: 0.2 % (ref 0–0.9)
LDH SERPL L TO P-CCNC: 158 U/L (ref 84–246)
LYMPHOCYTES # BLD AUTO: 1.17 X10*3/UL (ref 0.8–3)
LYMPHOCYTES NFR BLD AUTO: 27.7 %
MCH RBC QN AUTO: 27.5 PG (ref 26–34)
MCHC RBC AUTO-ENTMCNC: 32.3 G/DL (ref 32–36)
MCV RBC AUTO: 85 FL (ref 80–100)
MONOCYTES # BLD AUTO: 0.39 X10*3/UL (ref 0.05–0.8)
MONOCYTES NFR BLD AUTO: 9.2 %
NEUTROPHILS # BLD AUTO: 2.52 X10*3/UL (ref 1.6–5.5)
NEUTROPHILS NFR BLD AUTO: 59.8 %
NRBC BLD-RTO: 0 /100 WBCS (ref 0–0)
PLATELET # BLD AUTO: 175 X10*3/UL (ref 150–450)
POTASSIUM SERPL-SCNC: 3.6 MMOL/L (ref 3.5–5.3)
PROT SERPL-MCNC: 6.3 G/DL (ref 6.4–8.2)
RBC # BLD AUTO: 4.8 X10*6/UL (ref 4–5.2)
SODIUM SERPL-SCNC: 139 MMOL/L (ref 136–145)
WBC # BLD AUTO: 4.2 X10*3/UL (ref 4.4–11.3)

## 2024-11-12 PROCEDURE — 3078F DIAST BP <80 MM HG: CPT | Performed by: INTERNAL MEDICINE

## 2024-11-12 PROCEDURE — 36415 COLL VENOUS BLD VENIPUNCTURE: CPT | Performed by: INTERNAL MEDICINE

## 2024-11-12 PROCEDURE — 1159F MED LIST DOCD IN RCRD: CPT | Performed by: INTERNAL MEDICINE

## 2024-11-12 PROCEDURE — 80053 COMPREHEN METABOLIC PANEL: CPT | Performed by: INTERNAL MEDICINE

## 2024-11-12 PROCEDURE — 99213 OFFICE O/P EST LOW 20 MIN: CPT | Performed by: INTERNAL MEDICINE

## 2024-11-12 PROCEDURE — 1126F AMNT PAIN NOTED NONE PRSNT: CPT | Performed by: INTERNAL MEDICINE

## 2024-11-12 PROCEDURE — 83615 LACTATE (LD) (LDH) ENZYME: CPT | Performed by: INTERNAL MEDICINE

## 2024-11-12 PROCEDURE — 3075F SYST BP GE 130 - 139MM HG: CPT | Performed by: INTERNAL MEDICINE

## 2024-11-12 PROCEDURE — 1036F TOBACCO NON-USER: CPT | Performed by: INTERNAL MEDICINE

## 2024-11-12 PROCEDURE — 85025 COMPLETE CBC W/AUTO DIFF WBC: CPT | Performed by: INTERNAL MEDICINE

## 2024-11-12 ASSESSMENT — PAIN SCALES - GENERAL: PAINLEVEL_OUTOF10: 0-NO PAIN

## 2024-11-12 NOTE — PROGRESS NOTES
LOCATION:  [facility].     HEMATOLOGY & ONCOLOGY PROBLEMS:  1.  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       a.  Incidental finding on routine colonoscopy in 2022.       b.  Unremarkable staging PET scan.          c.  Currently being followed by close observation.     CHIEF COMPLAINT:    The patient is in the clinic today for management of colonic low-grade non-Hodgkin lymphoma.     HISTORY:   Ms. Briseno is a 74 year old pleasant female with incidental finding of atypical lymphoid infiltrate near the appendiceal orifice on routine colonoscopy in 2022 done by Dr. Alvarez.  Further histochemical and pathology review suggestive of most probable low-grade lymphoproliferative disorder.  Her last  colonoscopy in  was unremarkable.  Clinically she has issues with generalized weakness and fatigue but no other specific localizing signs and symptoms. A staging PET scan was unremarkable other than mild activity at the colon biopsy site.  There was no evidence of visceral lymphadenopathy etc.  She is currently being followed by close observation.     INTERVAL HISTORY:  Had a recent sinus infection during [place] vacation. Denies any specific new complaints.  No history of fever, weight loss or night sweats.      PAST MEDICAL HISTORY:   1.  Hyperlipidemia.   2.  History of TIA.   3.  Chronic insomnia   4.  Degenerative joint disease   5.  History of C-sections, back / wrist and cervical fusion surgeries.     SOCIAL HISTORY:    for 41 years and lives in [place].  Non-smoker.  Rare social alcohol intake.  She work as a part-time  at a local [employer].  Born and raised in [place].     FAMILY HISTORY:    Father  at age 58 from myocardial infarction.  Mother  at age 92 from lung cancer.  1 brother  at age 72, patient not aware of the medical details.  2 children and 5 grandkids all alive and well. No other specific history of  bleeding, clotting  or malignant disorder in the family.     REVIEW OF SYSTEMS:  Pertinent finding as per the history above.  All other systems have been reviewed and generally negative and noncontributory.     ALLERGY & MEDICATIONS:  Allergies and latest outpatient medications list were reviewed in the EMR.    VITAL SIGNS  BSA: There is no height or weight on file to calculate BSA.  There were no vitals taken for this visit.    PHYSICAL EXAMINATION:  Detailed examination not done.    LAB RESULTS:  CBC and CMP from today is pending but they were all normal in May 2024.  Last 3 sets of blood work were reviewed and the trend was noted.    RADIOLOGY RESULT:  PET/CT Lymphoma Staging [Feb 7 2023  2:47PM]  Impression:  1. Moderately intense focal hypermetabolic activity with associated wall thickening in the distal cecum, unchanged from prior PET-CT and likely corresponding the patient's biopsy-proven low-grade B-cell lymphoma.  2. No new sites of focal hypermetabolic disease to suggest lymphomatous involvement.     PATHOLOGY RESULTS:  [id number] ( [facility]/20[facility])   Name CHEMO WESTLisandro   Accession #: [id number]   Pathologist: FANY MORELAND JR, MD, PhD.   Date of Procedure: 6/20/2022    Submitting Physician: PASTORA BENITEZ MD   Location: [facility] External # [id number]   FINAL DIAGNOSIS   A. COLON, BIOPSY([facility],  (2/8/2022)):   -- LOW GRADE B CELL LYMPHOMA, FAVOR EXTRANODAL MARGINAL ZONE LYMPHOMA.    MORPHOLOGY: The specimen consist of several fragments of colonic mucosa effaced by sheets of small lymphoid cells with slightly irregular nuclear contour and scant cytoplasm. A focal lymphoepithelial lesion is seen.   IMMUNOHISTOCHEMISTRY:   Immunohistochemical  stains performed at the outside institution and reviewed at [facility] reveal the atypical lymphoid infiltrate is   CD20: Positive   BCL2: Positive   CD3 & CD5: Negative. Highlights background T cells.   Cyclin D1:  Negative   CD10: Negative.  Highlights few residual germinal centers.   CD43: Negative. Highlights background T cells and plasma cells   No meshwork stains was performed. No kappa/lambda staining to evaluate for plasma cell light chain restriction was performed. Ki67 proliferation  index was not assesed.   MOLECULAR STUDIES: Per report molecular studies was positive for a clonal IgH and Ig kappa rearrangement.   CYTOGENETICS: FISH performed and interpreted at outside institution, ([facility], [place], [place]), was reported to be negative for BIRC-MALT1 fusion.   The gross and/or microscopic findings were reviewed in conjunction with pathology resident, BROOKLYN Lott.   Electronically Signed Out By FANY MORELAND JR, MD,  PhD./DODIE      ASSESSMENT AND PLAN:   1.  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 Please refer to the details of initial presentation and management as outlined above. In  summary, patient with incidental finding of low-grade B-cell lymphoproliferative disorder involving the colon on routine colonoscopy in Feb 2022.  Clinically she has issues with generalized anxiety and weakness but no specific history of night sweats,  weight loss or fever. After initial hematology oncology evaluation an initial PET scan was essentially unremarkable other than localized cecal activity.     Again, long discussion with the patient ans she is explained about the pathology reports and the F/U PET scan findings.  She had an isolated foci of low-grade lymphoproliferative lesion in the colon.  There is no evidence of  locoregional lymphadenopathy or metastatic disease.  Clinically she is essentially asymptomatic and follow-up blood work has been unremarkable.  She is currently being followed by close observation.  I advised her to continue to follow-up with the GI for repeat colonoscopy.     2. Follow up:  Patient will return to the clinic in 6 months.  She will be scheduled for F/U PET scan just prior to next visit Advised to contact us immediately if there are any new questions or problems.     This note has been transcribed using Dragon voice recognition system and there is a possibility of unintentional typing misprints.

## 2025-01-28 ENCOUNTER — APPOINTMENT (OUTPATIENT)
Dept: ORTHOPEDIC SURGERY | Facility: CLINIC | Age: 75
End: 2025-01-28
Payer: MEDICARE

## 2025-01-28 ENCOUNTER — HOSPITAL ENCOUNTER (OUTPATIENT)
Dept: RADIOLOGY | Facility: EXTERNAL LOCATION | Age: 75
Discharge: HOME | End: 2025-01-28

## 2025-01-28 VITALS — WEIGHT: 165 LBS | BODY MASS INDEX: 30.36 KG/M2 | HEIGHT: 62 IN

## 2025-01-28 DIAGNOSIS — M17.12 PRIMARY OSTEOARTHRITIS OF LEFT KNEE: Primary | ICD-10-CM

## 2025-01-28 DIAGNOSIS — M25.562 CHRONIC PAIN OF LEFT KNEE: ICD-10-CM

## 2025-01-28 DIAGNOSIS — G89.29 CHRONIC PAIN OF LEFT KNEE: ICD-10-CM

## 2025-01-28 PROCEDURE — 3008F BODY MASS INDEX DOCD: CPT | Performed by: FAMILY MEDICINE

## 2025-01-28 PROCEDURE — 99213 OFFICE O/P EST LOW 20 MIN: CPT | Performed by: FAMILY MEDICINE

## 2025-01-28 PROCEDURE — 1159F MED LIST DOCD IN RCRD: CPT | Performed by: FAMILY MEDICINE

## 2025-01-28 PROCEDURE — 20611 DRAIN/INJ JOINT/BURSA W/US: CPT | Performed by: FAMILY MEDICINE

## 2025-01-28 PROCEDURE — 1036F TOBACCO NON-USER: CPT | Performed by: FAMILY MEDICINE

## 2025-01-28 PROCEDURE — 1160F RVW MEDS BY RX/DR IN RCRD: CPT | Performed by: FAMILY MEDICINE

## 2025-01-28 RX ORDER — LIDOCAINE HYDROCHLORIDE 20 MG/ML
2 INJECTION, SOLUTION INFILTRATION; PERINEURAL
Status: COMPLETED | OUTPATIENT
Start: 2025-01-28 | End: 2025-01-28

## 2025-01-28 RX ORDER — TRIAMCINOLONE ACETONIDE 40 MG/ML
40 INJECTION, SUSPENSION INTRA-ARTICULAR; INTRAMUSCULAR
Status: COMPLETED | OUTPATIENT
Start: 2025-01-28 | End: 2025-01-28

## 2025-01-28 RX ADMIN — TRIAMCINOLONE ACETONIDE 40 MG: 40 INJECTION, SUSPENSION INTRA-ARTICULAR; INTRAMUSCULAR at 15:24

## 2025-01-28 RX ADMIN — LIDOCAINE HYDROCHLORIDE 2 ML: 20 INJECTION, SOLUTION INFILTRATION; PERINEURAL at 15:24

## 2025-01-28 NOTE — PROGRESS NOTES
History of Present Illness   Chief Complaint   Patient presents with    Left Knee - Follow-up       The patient is 74 y.o. female  here for follow-up of left knee pain.  Patient has known osteoarthritis of her left knee, advanced in the patellofemoral compartment, mild in the medial compartment on previous imaging.  Treatment has been conservative, she was last seen by me 3-1/2 months ago, at that time she was treated with left knee injection with Kenalog which was of good relief.  Says she was doing well until around 2 weeks ago, says she started working with a , felt like she was getting stronger, decided to walk on the track at her gym, says she did 12 laps and at the last few laps she had some notable discomfort in her left knee that has persisted.  Pain is somewhat generalized, symptoms are exacerbated by walking, standing, better with rest, she has been using a cane to assist in ambulation.  She has been taking over-the-counter medications for pain.    Past Medical History:   Diagnosis Date    Disease of upper respiratory tract, unspecified     Upper respiratory disease    Personal history of other diseases of the musculoskeletal system and connective tissue     History of arthritis    Personal history of other endocrine, nutritional and metabolic disease     History of hypercholesterolemia       Medication Documentation Review Audit       Reviewed by Samson Velasquez MD (Physician) on 01/28/25 at 1524      Medication Order Taking? Sig Documenting Provider Last Dose Status   aspirin 81 mg chewable tablet [id number]  Chew 1 tablet (81 mg) once daily. Historical Provider, MD  Active   coenzyme Q-10 100 mg capsule [id number] No Take 1 capsule (100 mg) by mouth once daily. Historical Provider, MD Taking Active   ibuprofen 800 mg tablet [id number] No Take 1 tablet (800 mg) by mouth 2 times a day as needed. Historical Provider, MD Taking Active   simvastatin (Zocor) 40 mg tablet [id number]  TAKE ONE TABLET  BY MOUTH AT BEDTIME Carl Melissa, DO  Active   traZODone (Desyrel) 50 mg tablet [id number]  Take 1 tablet (50 mg) by mouth as needed at bedtime for sleep. Carl Melissa, DO  Active                    Allergies   Allergen Reactions    Cephalexin Rash       Social History     Socioeconomic History    Marital status:      Spouse name: Not on file    Number of children: Not on file    Years of education: Not on file    Highest education level: Not on file   Occupational History    Not on file   Tobacco Use    Smoking status: Never     Passive exposure: Never    Smokeless tobacco: Never   Substance and Sexual Activity    Alcohol use: Yes     Comment: social    Drug use: Never    Sexual activity: Not on file   Other Topics Concern    Not on file   Social History Narrative    Not on file     Social Drivers of Health     Financial Resource Strain: Not on file   Food Insecurity: Not on file   Transportation Needs: Not on file   Physical Activity: Not on file   Stress: Not on file   Social Connections: Not on file   Intimate Partner Violence: Not on file   Housing Stability: Not on file       Past Surgical History:   Procedure Laterality Date    BACK SURGERY  2015    Back Surgery     SECTION, CLASSIC  2014     Section    COLONOSCOPY  2015    Colonoscopy (Fiberoptic)    CT ANGIO NECK  2021    CT NECK ANGIO W AND WO IV CONTRAST [facility]    CT HEAD ANGIO W AND WO IV CONTRAST  2021    CT HEAD ANGIO W AND WO IV CONTRAST 2021 [facility]    OTHER SURGICAL HISTORY  2014    Exploration Of Spinal Fusion    OTHER SURGICAL HISTORY  10/13/2021    Wrist fracture repair          Review of Systems   GENERAL: Negative  GI: Negative  MUSCULOSKELETAL: See HPI  SKIN: Negative  NEURO:  Negative     Physical Exam:    General/Constitutional: well appearing, no distress, appears stated age  HEENT: sclera clear  Respiratory: non labored  breathing  Vascular: No edema, swelling or tenderness, except as noted in detailed exam.  Integumentary: No impressive skin lesions present, except as noted in detailed exam.  Neurological:  Alert and oriented   Psychological:  Normal mood and affect.  Musculoskeletal: Normal, except as noted in detailed exam and in HPI. antalgic gait, with cane    left knee: Normal appearance, no skin changes.  There is a trace joint effusion.  She has some medial joint tenderness, no significant tenderness at the medial tibia, tenderness at the medial femoral condyle.  Range of motion from 0 to 120 degrees with some discomfort endrange of flexion.  No motor deficits present, no pain with strength testing.  Negative Griffin, negative Lachman, negative posterior drawer.  Stable to varus and valgus stress.       Imaging: No new imaging today.      L Inj/Asp: L knee on 1/28/2025 3:24 PM  Indications: pain  Details: 22 G needle, ultrasound-guided superolateral approach  Medications: 40 mg triamcinolone acetonide 40 mg/mL; 2 mL lidocaine 20 mg/mL (2 %)  Outcome: tolerated well, no immediate complications    Left knee joint and surrounding structures were appropriately visualized before and during injection    Ultrasound images were permanently uploaded to the medical record/PACS  Procedure, treatment alternatives, risks and benefits explained, specific risks discussed. Consent was given by the patient. Immediately prior to procedure a time out was called to verify the correct patient, procedure, equipment, support staff and site/side marked as required. Patient was prepped and draped in the usual sterile fashion.               Assessment   1. Primary osteoarthritis of left knee        2. Chronic pain of left knee  Point of Care Ultrasound            Plan: Recurrent left knee pain, symptoms thought to be secondary to underlying osteoarthritis.  We discussed further workup and treatment.  Decision was made to proceed with ultrasound-guided  left knee joint injection with Kenalog, patient tolerated procedure without issue.  She can return back to activity with her  gradually as tolerated by symptoms.  She will follow-up as symptoms dictate.  Would plan for updated knee x-rays at next visit.

## 2025-01-31 ENCOUNTER — TELEPHONE (OUTPATIENT)
Dept: ORTHOPEDIC SURGERY | Facility: CLINIC | Age: 75
End: 2025-01-31
Payer: MEDICARE

## 2025-01-31 NOTE — TELEPHONE ENCOUNTER
Pt called saying she is still in 8/10 pain after having injection in left knee at last appointment. Pt was told to call back if symptoms haven't improved.

## 2025-01-31 NOTE — TELEPHONE ENCOUNTER
Please tell patient to give it up to 2 weeks for injection to take full effect, if she is still having symptoms 2 weeks from time of injection please reach back out to the office.  She can use 1000 mg of Tylenol 3 times a day for pain, she can use over-the-counter anti-inflammatories as needed while waiting for an injection to take effect.

## 2025-02-10 ENCOUNTER — HOSPITAL ENCOUNTER (OUTPATIENT)
Dept: RADIOLOGY | Facility: CLINIC | Age: 75
Discharge: HOME | End: 2025-02-10
Payer: MEDICARE

## 2025-02-10 ENCOUNTER — APPOINTMENT (OUTPATIENT)
Dept: ORTHOPEDIC SURGERY | Facility: CLINIC | Age: 75
End: 2025-02-10
Payer: MEDICARE

## 2025-02-10 ENCOUNTER — APPOINTMENT (OUTPATIENT)
Dept: PRIMARY CARE | Facility: CLINIC | Age: 75
End: 2025-02-10
Payer: MEDICARE

## 2025-02-10 VITALS
HEIGHT: 62 IN | BODY MASS INDEX: 29.63 KG/M2 | OXYGEN SATURATION: 96 % | DIASTOLIC BLOOD PRESSURE: 58 MMHG | WEIGHT: 161 LBS | HEART RATE: 64 BPM | SYSTOLIC BLOOD PRESSURE: 134 MMHG

## 2025-02-10 DIAGNOSIS — Z12.39 ENCOUNTER FOR SCREENING FOR MALIGNANT NEOPLASM OF BREAST, UNSPECIFIED SCREENING MODALITY: ICD-10-CM

## 2025-02-10 DIAGNOSIS — M25.562 ACUTE PAIN OF LEFT KNEE: ICD-10-CM

## 2025-02-10 DIAGNOSIS — M25.562 ACUTE PAIN OF LEFT KNEE: Primary | ICD-10-CM

## 2025-02-10 DIAGNOSIS — G47.00 INSOMNIA, UNSPECIFIED TYPE: ICD-10-CM

## 2025-02-10 DIAGNOSIS — I10 PRIMARY HYPERTENSION: ICD-10-CM

## 2025-02-10 DIAGNOSIS — Z12.11 COLON CANCER SCREENING: ICD-10-CM

## 2025-02-10 DIAGNOSIS — E78.2 MIXED HYPERLIPIDEMIA: ICD-10-CM

## 2025-02-10 DIAGNOSIS — Z12.31 ENCOUNTER FOR SCREENING MAMMOGRAM FOR MALIGNANT NEOPLASM OF BREAST: ICD-10-CM

## 2025-02-10 PROCEDURE — 3075F SYST BP GE 130 - 139MM HG: CPT | Performed by: STUDENT IN AN ORGANIZED HEALTH CARE EDUCATION/TRAINING PROGRAM

## 2025-02-10 PROCEDURE — 1036F TOBACCO NON-USER: CPT | Performed by: STUDENT IN AN ORGANIZED HEALTH CARE EDUCATION/TRAINING PROGRAM

## 2025-02-10 PROCEDURE — 3008F BODY MASS INDEX DOCD: CPT | Performed by: STUDENT IN AN ORGANIZED HEALTH CARE EDUCATION/TRAINING PROGRAM

## 2025-02-10 PROCEDURE — 1160F RVW MEDS BY RX/DR IN RCRD: CPT | Performed by: STUDENT IN AN ORGANIZED HEALTH CARE EDUCATION/TRAINING PROGRAM

## 2025-02-10 PROCEDURE — 73562 X-RAY EXAM OF KNEE 3: CPT | Mod: LT

## 2025-02-10 PROCEDURE — 1159F MED LIST DOCD IN RCRD: CPT | Performed by: STUDENT IN AN ORGANIZED HEALTH CARE EDUCATION/TRAINING PROGRAM

## 2025-02-10 PROCEDURE — 99213 OFFICE O/P EST LOW 20 MIN: CPT | Performed by: STUDENT IN AN ORGANIZED HEALTH CARE EDUCATION/TRAINING PROGRAM

## 2025-02-10 PROCEDURE — 73562 X-RAY EXAM OF KNEE 3: CPT | Mod: LEFT SIDE | Performed by: RADIOLOGY

## 2025-02-10 PROCEDURE — 3078F DIAST BP <80 MM HG: CPT | Performed by: STUDENT IN AN ORGANIZED HEALTH CARE EDUCATION/TRAINING PROGRAM

## 2025-02-10 PROCEDURE — G2211 COMPLEX E/M VISIT ADD ON: HCPCS | Performed by: STUDENT IN AN ORGANIZED HEALTH CARE EDUCATION/TRAINING PROGRAM

## 2025-02-10 RX ORDER — SIMVASTATIN 40 MG/1
40 TABLET, FILM COATED ORAL NIGHTLY
Qty: 90 TABLET | Refills: 1 | Status: SHIPPED | OUTPATIENT
Start: 2025-02-10

## 2025-02-10 RX ORDER — ESTRADIOL 0.1 MG/G
1 CREAM VAGINAL DAILY
COMMUNITY
Start: 2025-01-10 | End: 2025-10-07

## 2025-02-10 RX ORDER — NAPROXEN SODIUM 220 MG/1
81 TABLET, FILM COATED ORAL DAILY
Qty: 90 TABLET | Refills: 1 | Status: SHIPPED | OUTPATIENT
Start: 2025-02-10 | End: 2025-08-09

## 2025-02-10 RX ORDER — TRAZODONE HYDROCHLORIDE 50 MG/1
50 TABLET ORAL NIGHTLY PRN
Qty: 90 TABLET | Refills: 1 | Status: SHIPPED | OUTPATIENT
Start: 2025-02-10 | End: 2025-08-09

## 2025-02-10 RX ORDER — PREDNISONE 20 MG/1
20 TABLET ORAL DAILY
Qty: 5 TABLET | Refills: 0 | Status: SHIPPED | OUTPATIENT
Start: 2025-02-10 | End: 2025-02-15

## 2025-02-10 ASSESSMENT — ENCOUNTER SYMPTOMS
ARTHRALGIAS: 1
CARDIOVASCULAR NEGATIVE: 1
PSYCHIATRIC NEGATIVE: 1
RESPIRATORY NEGATIVE: 1
GASTROINTESTINAL NEGATIVE: 1
CONSTITUTIONAL NEGATIVE: 1
NEUROLOGICAL NEGATIVE: 1
JOINT SWELLING: 1
MYALGIAS: 1

## 2025-02-10 ASSESSMENT — COLUMBIA-SUICIDE SEVERITY RATING SCALE - C-SSRS
2. HAVE YOU ACTUALLY HAD ANY THOUGHTS OF KILLING YOURSELF?: NO
6. HAVE YOU EVER DONE ANYTHING, STARTED TO DO ANYTHING, OR PREPARED TO DO ANYTHING TO END YOUR LIFE?: NO
1. IN THE PAST MONTH, HAVE YOU WISHED YOU WERE DEAD OR WISHED YOU COULD GO TO SLEEP AND NOT WAKE UP?: NO

## 2025-02-10 NOTE — PROGRESS NOTES
"Subjective   Patient ID: Maddie Briseon is a 74 y.o. female.    Patient seen on sick visit.  She regards that she recently got a knee injection a couple weeks ago.  And has had significant swelling of her leg, as well as feelings of additional pain.  Has been trying ibuprofen and Tylenol per the orthopedic recommendations, however has had minimal improvement of her symptoms.  Otherwise, regards no fevers chills Or constitutional symptoms at this time.  Needs refills of her other medications    Knee Pain         Review of Systems   Constitutional: Negative.    HENT: Negative.     Respiratory: Negative.     Cardiovascular: Negative.    Gastrointestinal: Negative.    Musculoskeletal:  Positive for arthralgias, joint swelling and myalgias.   Neurological: Negative.    Psychiatric/Behavioral: Negative.         Objective Visit Vitals  /58 (BP Location: Left arm, Patient Position: Sitting, BP Cuff Size: Adult)   Pulse 64   Ht 1.575 m (5' 2\")   Wt 73 kg (161 lb)   SpO2 96%   BMI 29.45 kg/m²   OB Status Postmenopausal   Smoking Status Never   BSA 1.79 m²      Physical Exam  Constitutional:       General: She is not in acute distress.     Appearance: She is not ill-appearing.   Eyes:      Pupils: Pupils are equal, round, and reactive to light.   Cardiovascular:      Rate and Rhythm: Normal rate and regular rhythm.      Pulses: Normal pulses.      Heart sounds: No murmur heard.  Pulmonary:      Effort: No respiratory distress.      Breath sounds: No wheezing.   Abdominal:      General: Abdomen is flat. Bowel sounds are normal. There is no distension.   Musculoskeletal:         General: Tenderness present.      Right lower leg: No edema.      Left lower leg: No edema.      Comments: Slight fluid collection   Skin:     General: Skin is warm and dry.   Neurological:      Mental Status: She is alert. Mental status is at baseline.      Cranial Nerves: No cranial nerve deficit.      Motor: Weakness present.   Psychiatric:   "       Mood and Affect: Mood normal.         Behavior: Behavior normal.       Assessment/Plan   There are no diagnoses linked to this encounter.       History of TIA  -Has followed with neurology as well as cardiology after discovering PFO  -Continue aspirin 81 mg daily  -Has restarted this due to cholesterol readings refilled today     Insomnia  Ambien has been discontinued continue current regimen     #Knee pain  Suspect underlying inflammatory process recommend prednisone 20 mg as well as repeat x-ray, MRI imaging if no improvement    Healthcare maintenance  -Colonoscopy 2/2022, repeat 5-10 years order mammogram, lab work reviewed and due at time of year mammogram colonoscopy ordered  -Had mammogram done 2021 through [facility] 6 months or earlier as needed

## 2025-02-12 ENCOUNTER — TELEPHONE (OUTPATIENT)
Dept: PRIMARY CARE | Facility: CLINIC | Age: 75
End: 2025-02-12
Payer: MEDICARE

## 2025-03-17 ENCOUNTER — HOSPITAL ENCOUNTER (OUTPATIENT)
Dept: RADIOLOGY | Facility: CLINIC | Age: 75
Discharge: HOME | End: 2025-03-17
Payer: MEDICARE

## 2025-03-17 VITALS — HEIGHT: 62 IN | WEIGHT: 161 LBS | BODY MASS INDEX: 29.63 KG/M2

## 2025-03-17 DIAGNOSIS — Z12.31 ENCOUNTER FOR SCREENING MAMMOGRAM FOR MALIGNANT NEOPLASM OF BREAST: ICD-10-CM

## 2025-03-17 DIAGNOSIS — Z12.39 ENCOUNTER FOR SCREENING FOR MALIGNANT NEOPLASM OF BREAST, UNSPECIFIED SCREENING MODALITY: ICD-10-CM

## 2025-03-17 PROCEDURE — 77067 SCR MAMMO BI INCL CAD: CPT | Performed by: RADIOLOGY

## 2025-03-17 PROCEDURE — 77067 SCR MAMMO BI INCL CAD: CPT

## 2025-03-17 PROCEDURE — 77063 BREAST TOMOSYNTHESIS BI: CPT | Performed by: RADIOLOGY

## 2025-05-02 ENCOUNTER — TELEPHONE (OUTPATIENT)
Dept: HEMATOLOGY/ONCOLOGY | Facility: CLINIC | Age: 75
End: 2025-05-02
Payer: MEDICARE

## 2025-05-02 NOTE — TELEPHONE ENCOUNTER
Reason for Conversation  Called regarding Pet Scan denial  Background   Patient called her Pet Scan was denied by insurance and she is calling to see what Dr. Reeder wants to do. Phone # [phone]    Disposition   No disposition on file.

## 2025-05-02 NOTE — TELEPHONE ENCOUNTER
Reason for Conversation  called regarding Pet Scan denial    Background   Patient called her Pet Scan was denied by insurance and she is calling to see what Dr. Reeder wants to do. Phone # [phone]     Disposition   I sent an inbasket message to [employer] to see if there is any information regarding denial, as none has been received in the office. Awaiting response and patient is aware.   UPDATE: [id number]; per email from [place] scan was denied as it's not being used for any of the following: guide first treatment, at end of treatment to guide further treatment, or during treatment to gauge response. Per Dr. Reeder we will not appeal at this time and he will not order a CT scan as patient remains asymptomatic. Left patient a message to call me back - will inform her that Dr. Reeder will cancel PET scan and monitor her labs / physical exam going forward. In the future if need arises he will order CT scans. Awaiting patient's return phone call.

## 2025-05-05 DIAGNOSIS — D47.9 LYMPHOPROLIFERATIVE DISORDER (MULTI): Primary | ICD-10-CM

## 2025-05-08 ENCOUNTER — TELEPHONE (OUTPATIENT)
Dept: HEMATOLOGY/ONCOLOGY | Facility: CLINIC | Age: 75
End: 2025-05-08
Payer: MEDICARE

## 2025-05-08 NOTE — TELEPHONE ENCOUNTER
Patient contacted and informed that  is gone for the day, someone will call her tomorrow to reschedule her office visit for after her scan and pt stated understanding.

## 2025-05-08 NOTE — TELEPHONE ENCOUNTER
Reason for Conversation  Patient is asking to speak to RN re: her impending appointment. Patient is booked prior to her CT. Should this be adjusted to be after CT    Background       Disposition   No disposition on file.

## 2025-05-12 ENCOUNTER — APPOINTMENT (OUTPATIENT)
Dept: RADIOLOGY | Facility: CLINIC | Age: 75
End: 2025-05-12
Payer: MEDICARE

## 2025-05-14 ENCOUNTER — LAB (OUTPATIENT)
Dept: LAB | Facility: HOSPITAL | Age: 75
End: 2025-05-14
Payer: MEDICARE

## 2025-05-14 DIAGNOSIS — I10 HTN (HYPERTENSION): Primary | ICD-10-CM

## 2025-05-14 DIAGNOSIS — I10 ESSENTIAL (PRIMARY) HYPERTENSION: Primary | ICD-10-CM

## 2025-05-14 LAB
CREAT SERPL-MCNC: 0.99 MG/DL (ref 0.5–1.05)
EGFRCR SERPLBLD CKD-EPI 2021: 60 ML/MIN/1.73M*2

## 2025-05-14 PROCEDURE — 36415 COLL VENOUS BLD VENIPUNCTURE: CPT

## 2025-05-14 PROCEDURE — 82565 ASSAY OF CREATININE: CPT

## 2025-05-19 ENCOUNTER — APPOINTMENT (OUTPATIENT)
Dept: HEMATOLOGY/ONCOLOGY | Facility: CLINIC | Age: 75
End: 2025-05-19
Payer: MEDICARE

## 2025-05-21 ENCOUNTER — HOSPITAL ENCOUNTER (OUTPATIENT)
Dept: RADIOLOGY | Facility: CLINIC | Age: 75
Discharge: HOME | End: 2025-05-21
Payer: MEDICARE

## 2025-05-21 DIAGNOSIS — D47.9 LYMPHOPROLIFERATIVE DISORDER (MULTI): ICD-10-CM

## 2025-05-21 PROCEDURE — 74177 CT ABD & PELVIS W/CONTRAST: CPT

## 2025-05-21 PROCEDURE — 2550000001 HC RX 255 CONTRASTS: Mod: JZ | Performed by: INTERNAL MEDICINE

## 2025-05-21 RX ADMIN — IOHEXOL 75 ML: 350 INJECTION, SOLUTION INTRAVENOUS at 09:11

## 2025-05-29 ENCOUNTER — OFFICE VISIT (OUTPATIENT)
Dept: HEMATOLOGY/ONCOLOGY | Facility: CLINIC | Age: 75
End: 2025-05-29
Payer: MEDICARE

## 2025-05-29 ENCOUNTER — LAB (OUTPATIENT)
Dept: LAB | Facility: CLINIC | Age: 75
End: 2025-05-29
Payer: MEDICARE

## 2025-05-29 VITALS
WEIGHT: 161.16 LBS | SYSTOLIC BLOOD PRESSURE: 138 MMHG | HEART RATE: 61 BPM | TEMPERATURE: 97.7 F | BODY MASS INDEX: 29.48 KG/M2 | RESPIRATION RATE: 20 BRPM | OXYGEN SATURATION: 95 % | DIASTOLIC BLOOD PRESSURE: 63 MMHG

## 2025-05-29 DIAGNOSIS — D47.9 LYMPHOPROLIFERATIVE DISORDER (MULTI): Primary | ICD-10-CM

## 2025-05-29 DIAGNOSIS — D47.9 LYMPHOPROLIFERATIVE DISORDER (MULTI): ICD-10-CM

## 2025-05-29 LAB
ALBUMIN SERPL BCP-MCNC: 4.2 G/DL (ref 3.4–5)
ALP SERPL-CCNC: 54 U/L (ref 33–136)
ALT SERPL W P-5'-P-CCNC: 12 U/L (ref 7–45)
ANION GAP SERPL CALC-SCNC: 12 MMOL/L (ref 10–20)
AST SERPL W P-5'-P-CCNC: 17 U/L (ref 9–39)
BASOPHILS # BLD AUTO: 0.06 X10*3/UL (ref 0–0.1)
BASOPHILS NFR BLD AUTO: 1.3 %
BILIRUB SERPL-MCNC: 0.6 MG/DL (ref 0–1.2)
BUN SERPL-MCNC: 15 MG/DL (ref 6–23)
CALCIUM SERPL-MCNC: 9.2 MG/DL (ref 8.6–10.3)
CHLORIDE SERPL-SCNC: 105 MMOL/L (ref 98–107)
CO2 SERPL-SCNC: 25 MMOL/L (ref 21–32)
CREAT SERPL-MCNC: 0.99 MG/DL (ref 0.5–1.05)
EGFRCR SERPLBLD CKD-EPI 2021: 60 ML/MIN/1.73M*2
EOSINOPHIL # BLD AUTO: 0.09 X10*3/UL (ref 0–0.4)
EOSINOPHIL NFR BLD AUTO: 1.9 %
ERYTHROCYTE [DISTWIDTH] IN BLOOD BY AUTOMATED COUNT: 12.6 % (ref 11.5–14.5)
GLUCOSE SERPL-MCNC: 98 MG/DL (ref 74–99)
HCT VFR BLD AUTO: 41.9 % (ref 36–46)
HGB BLD-MCNC: 13.7 G/DL (ref 12–16)
IMM GRANULOCYTES # BLD AUTO: 0.01 X10*3/UL (ref 0–0.5)
IMM GRANULOCYTES NFR BLD AUTO: 0.2 % (ref 0–0.9)
LYMPHOCYTES # BLD AUTO: 1.38 X10*3/UL (ref 0.8–3)
LYMPHOCYTES NFR BLD AUTO: 29.4 %
MCH RBC QN AUTO: 28 PG (ref 26–34)
MCHC RBC AUTO-ENTMCNC: 32.7 G/DL (ref 32–36)
MCV RBC AUTO: 86 FL (ref 80–100)
MONOCYTES # BLD AUTO: 0.53 X10*3/UL (ref 0.05–0.8)
MONOCYTES NFR BLD AUTO: 11.3 %
NEUTROPHILS # BLD AUTO: 2.62 X10*3/UL (ref 1.6–5.5)
NEUTROPHILS NFR BLD AUTO: 55.9 %
NRBC BLD-RTO: 0 /100 WBCS (ref 0–0)
PLATELET # BLD AUTO: 178 X10*3/UL (ref 150–450)
POTASSIUM SERPL-SCNC: 4.2 MMOL/L (ref 3.5–5.3)
PROT SERPL-MCNC: 6.6 G/DL (ref 6.4–8.2)
RBC # BLD AUTO: 4.9 X10*6/UL (ref 4–5.2)
SODIUM SERPL-SCNC: 138 MMOL/L (ref 136–145)
WBC # BLD AUTO: 4.7 X10*3/UL (ref 4.4–11.3)

## 2025-05-29 PROCEDURE — 3075F SYST BP GE 130 - 139MM HG: CPT | Performed by: INTERNAL MEDICINE

## 2025-05-29 PROCEDURE — 85025 COMPLETE CBC W/AUTO DIFF WBC: CPT

## 2025-05-29 PROCEDURE — 99214 OFFICE O/P EST MOD 30 MIN: CPT | Performed by: INTERNAL MEDICINE

## 2025-05-29 PROCEDURE — 36415 COLL VENOUS BLD VENIPUNCTURE: CPT

## 2025-05-29 PROCEDURE — 1126F AMNT PAIN NOTED NONE PRSNT: CPT | Performed by: INTERNAL MEDICINE

## 2025-05-29 PROCEDURE — 3078F DIAST BP <80 MM HG: CPT | Performed by: INTERNAL MEDICINE

## 2025-05-29 PROCEDURE — 1159F MED LIST DOCD IN RCRD: CPT | Performed by: INTERNAL MEDICINE

## 2025-05-29 PROCEDURE — 80053 COMPREHEN METABOLIC PANEL: CPT

## 2025-05-29 ASSESSMENT — PAIN SCALES - GENERAL: PAINLEVEL_OUTOF10: 0-NO PAIN

## 2025-05-29 NOTE — PROGRESS NOTES
LOCATION:  [facility] at [facility].     HEMATOLOGY & ONCOLOGY PROBLEMS:  1.  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       a.  Incidental finding on routine colonoscopy in 2022.       b.  Unremarkable staging PET scan.          c.  Currently being followed by close observation.     CHIEF COMPLAINT:    The patient is in the clinic today for management of colonic low-grade non-Hodgkin lymphoma.     HISTORY:   Ms. Briseno is a 75 year old pleasant female with incidental finding of atypical lymphoid infiltrate near the appendiceal orifice on routine colonoscopy in 2022 done by Dr. Alvarez.  Further histochemical and pathology review suggestive of most probable low-grade lymphoproliferative disorder.  Her last  colonoscopy in  was unremarkable.  Clinically she has issues with generalized weakness and fatigue but no other specific localizing signs and symptoms. A staging PET scan was unremarkable other than mild activity at the colon biopsy site.  There was no evidence of visceral lymphadenopathy etc.  She is currently being followed by close observation.     INTERVAL HISTORY:  Denies any specific new complaints.  No history of fever, weight loss or night sweats.  Extremely active and going on multiple vacations frequently.  Latest CT scan showed some questionable findings.    PAST MEDICAL HISTORY:   1.  Hyperlipidemia.   2.  History of TIA.   3.  Chronic insomnia   4.  Degenerative joint disease   5.  History of C-sections, back / wrist and cervical fusion surgeries.     SOCIAL HISTORY:    for 41 years and lives in [place].  Non-smoker.  Rare social alcohol intake.  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  Born and raised in [place]     FAMILY HISTORY:    Father  at age 58 from myocardial infarction.  Mother  at age 92 from lung cancer.  1 brother  at age 72, patient not aware of the medical details.  2 children and 5  grandkids all alive and well. No other specific history of bleeding, clotting  or malignant disorder in the family.     REVIEW OF SYSTEMS:  Pertinent finding as per the history above.  All other systems have been reviewed and generally negative and noncontributory.     ALLERGY & MEDICATIONS:  Allergies and latest outpatient medications list were reviewed in the EMR.    VITAL SIGNS:  BSA: 1.79 meters squared  /63   Pulse 61   Temp 36.5 °C (97.7 °F)   Resp 20   Wt 73.1 kg (161 lb 2.5 oz)   SpO2 95%   BMI 29.48 kg/m²     PHYSICAL EXAMINATION:  Detailed examination not done.    LAB RESULTS:  CBC and CMP from today is unremarkable. Last 3 sets of blood work were reviewed and the trend was noted.    RADIOLOGY RESULT:  CT C/A/P [05/21/2025]  Impression:  CHEST:  Scant small stable mediastinal lymph nodes and multiple fat centered stable lymph nodes in each axilla, most likely reactive.  However, there are 2 lymph nodes in the left axilla that are mildly larger today, do not have fatty reggie, and show some contrast enhancement that are suspicious. Consider further evaluation with repeat PET-CT scan.  DJD in the spine as described. Incidental stable left humeral head enchondroma.  Moderate 2 vessel bilateral coronary artery calcifications.  Scant stable bilateral lung nodules as described, most likely sequela of prior infection or inflammation. No new or enlarging abnormal density in either lung worrisome for tumor or pneumonia.     ABDOMEN/PELVIS:  Surgical and arthritic changes in the lumbosacral spine as described.  Hypodense lesion superiorly in segment 8 of the liver with either tiny foci of enhancement or calcification, not evident previously.  Differential considerations include ablation lesion and metastasis clinical correlation needed. If further imaging is needed, MRI would be the recommended exam.  Bilateral renal cysts as described.  Phrygian cap in the gallbladder, a variant of normal. Cholelithiasis  without acute associated inflammation.  Mild sigmoid diverticulosis without acute associated inflammation.  Mild nonspecific but grossly stable retroperitoneal adenopathy in the abdomen and mesenteric adenopathy adjacent to the right colon. Most likely reactive.     PATHOLOGY RESULTS:  Specimens: [facility], [id number] ( 2/8/2022)   Name [facility]   Accession #: [id number]   Pathologist: FANY MORELAND JR, MD, PhD.   Date of Procedure: 6/20/2022    Submitting Physician: PASTORA BENITEZ MD   Location: [facility] External # [id number]   FINAL DIAGNOSIS   A. COLON, BIOPSY([facility],  (2/8/2022)):   -- LOW GRADE B CELL LYMPHOMA, FAVOR EXTRANODAL MARGINAL ZONE LYMPHOMA.    MORPHOLOGY: The specimen consist of several fragments of colonic mucosa effaced by sheets of small lymphoid cells with slightly irregular nuclear contour and scant cytoplasm. A focal lymphoepithelial lesion is seen.   IMMUNOHISTOCHEMISTRY:   Immunohistochemical  stains performed at the outside institution and reviewed at [facility] reveal the atypical lymphoid infiltrate is   CD20: Positive   BCL2: Positive   CD3 & CD5: Negative. Highlights background T cells.   Cyclin D1: Negative   CD10: Negative.  Highlights few residual germinal centers.   CD43: Negative. Highlights background T cells and plasma cells   No meshwork stains was performed. No kappa/lambda staining to evaluate for plasma cell light chain restriction was performed. Ki67 proliferation  index was not assesed.   MOLECULAR STUDIES: Per report molecular studies was positive for a clonal IgH and Ig kappa rearrangement.   CYTOGENETICS: FISH performed and interpreted at outside institution, ([facility], [place], [place]), was reported to be negative for BIRC-MALT1 fusion.   The gross and/or microscopic findings were reviewed in conjunction with pathology resident, BROOKLYN Lott.   Electronically Signed Out By FANY MORELAND  MD MINDA,  PhD./DODIE      ASSESSMENT AND PLAN:   1.  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 Please refer to the details of initial presentation and management as outlined above. In  summary, patient with incidental finding of low-grade B-cell lymphoproliferative disorder involving the colon on routine colonoscopy in Feb 2022.  Clinically she has issues with generalized anxiety and weakness but no specific history of night sweats,  weight loss or fever. After initial hematology oncology evaluation an initial PET scan was essentially unremarkable other than localized cecal activity.     Again, long discussion with the patient ans she is explained about the pathology reports and the F/U PET scan findings.  She had an isolated foci of low-grade lymphoproliferative lesion in the colon.  There was  no evidence of  locoregional lymphadenopathy or metastatic disease.  Clinically she is essentially asymptomatic and follow-up blood work has been unremarkable.  She is currently being followed by close observation.  I advised her to continue to follow-up with the GI for repeat colonoscopy.    Latest follow-up CT scan results are concerning for new onset right axillary lymphadenopathy and a questionable hypodense lesion in the liver radiologist have recommended PET scan for further evaluation and he will be scheduled for the same.  Depending upon the PET scan result there is a possibility that he may end up needing a biopsy.      3. Follow up:  Patient will return to the clinic in 6 months.  In the meantime she will be scheduled for follow-up PET scan as detailed above.  Advised to contact us immediately if there are any new questions or problems.     This note has been transcribed using Dragon voice recognition system and there is a possibility of unintentional typing misprints.

## 2025-06-10 ENCOUNTER — HOSPITAL ENCOUNTER (OUTPATIENT)
Dept: RADIOLOGY | Facility: CLINIC | Age: 75
Discharge: HOME | End: 2025-06-10
Payer: MEDICARE

## 2025-06-10 ENCOUNTER — OFFICE VISIT (OUTPATIENT)
Dept: ORTHOPEDIC SURGERY | Facility: CLINIC | Age: 75
End: 2025-06-10
Payer: MEDICARE

## 2025-06-10 VITALS — BODY MASS INDEX: 30.36 KG/M2 | HEIGHT: 62 IN | WEIGHT: 165 LBS

## 2025-06-10 DIAGNOSIS — M25.562 CHRONIC PAIN OF LEFT KNEE: ICD-10-CM

## 2025-06-10 DIAGNOSIS — G89.29 CHRONIC PAIN OF LEFT KNEE: ICD-10-CM

## 2025-06-10 DIAGNOSIS — M17.12 PRIMARY OSTEOARTHRITIS OF LEFT KNEE: Primary | ICD-10-CM

## 2025-06-10 PROCEDURE — 99204 OFFICE O/P NEW MOD 45 MIN: CPT | Performed by: ORTHOPAEDIC SURGERY

## 2025-06-10 PROCEDURE — 73560 X-RAY EXAM OF KNEE 1 OR 2: CPT | Mod: 50

## 2025-06-10 PROCEDURE — 1036F TOBACCO NON-USER: CPT | Performed by: ORTHOPAEDIC SURGERY

## 2025-06-10 PROCEDURE — 73560 X-RAY EXAM OF KNEE 1 OR 2: CPT | Mod: LT

## 2025-06-10 PROCEDURE — 1160F RVW MEDS BY RX/DR IN RCRD: CPT | Performed by: ORTHOPAEDIC SURGERY

## 2025-06-10 PROCEDURE — 3008F BODY MASS INDEX DOCD: CPT | Performed by: ORTHOPAEDIC SURGERY

## 2025-06-10 PROCEDURE — 99212 OFFICE O/P EST SF 10 MIN: CPT | Performed by: ORTHOPAEDIC SURGERY

## 2025-06-10 PROCEDURE — 1159F MED LIST DOCD IN RCRD: CPT | Performed by: ORTHOPAEDIC SURGERY

## 2025-06-10 NOTE — LETTER
Mayda 10, 2025     Carl Melissa DO  [address] [facility]  [address]  [facility] [address]    Patient: Maddie Briseno   YOB: 1950   Date of Visit: 6/10/2025       Dear Dr. Carl Melissa DO:    Thank you for referring Maddie Briseno to me for evaluation. Below are my notes for this consultation.  If you have questions, please do not hesitate to call me. I look forward to following your patient along with you.       Sincerely,     Reed Francisco MD      CC: No Recipients  ______________________________________________________________________________________    75-year-old is seen with left knee pain.  She has been having persistent severe sharp shooting pain in the left knee.  Pain is worse with standing and walking and going up and down stairs and getting up and down from a chair in and out of car.  She had a cortisone injection in January with minimal improvement.  She has been undergoing treatment for lymphoma.  She is not currently receiving chemotherapy.    Pleasant and no acute distress. Walks with antalgic gait. Stands with varus alignment of the left knee and neutral on the right. Left knee range of motion is 5-110°. The knee is stable to varus and valgus stress Lachman and posterior drawer. There is a mild effusion. There is generalized tenderness. Right knee range of motion is 0-120°. There is no effusion. The knee is stable to varus and valgus stress Lachman and posterior drawer. Both lower extremities are well perfused the skin is intact and muscle tone is adequate.    Multiple x-ray views of the left knee are personally reviewed and there is advanced degenerative changes with loss of the medial compartment joint space and osteophyte formation subchondral sclerosis and cystic change.    A detailed discussion about arthritis was done.  Treatment options including no treatment were reviewed.  Discussion was done about knee replacement and the surgery and the postoperative course.   She has been through extensive conservative treatment but has persistent debilitating pain and knee replacement would be a reasonable option at this point.  She would need to be cleared medically and from a oncology standpoint.  Her last oncology note is reviewed and there were potentially concerning findings on her CT scan and need for a follow-up PET scan.  If she is clear from a medical and oncology standpoint then she could proceed with knee replacement.

## 2025-06-10 NOTE — PROGRESS NOTES
75-year-old is seen with left knee pain.  She has been having persistent severe sharp shooting pain in the left knee.  Pain is worse with standing and walking and going up and down stairs and getting up and down from a chair in and out of car.  She had a cortisone injection in January with minimal improvement.  She has been undergoing treatment for lymphoma.  She is not currently receiving chemotherapy.    Pleasant and no acute distress. Walks with antalgic gait. Stands with varus alignment of the left knee and neutral on the right. Left knee range of motion is 5-110°. The knee is stable to varus and valgus stress Lachman and posterior drawer. There is a mild effusion. There is generalized tenderness. Right knee range of motion is 0-120°. There is no effusion. The knee is stable to varus and valgus stress Lachman and posterior drawer. Both lower extremities are well perfused the skin is intact and muscle tone is adequate.    Multiple x-ray views of the left knee are personally reviewed and there is advanced degenerative changes with loss of the medial compartment joint space and osteophyte formation subchondral sclerosis and cystic change.    A detailed discussion about arthritis was done.  Treatment options including no treatment were reviewed.  Discussion was done about knee replacement and the surgery and the postoperative course.  She has been through extensive conservative treatment but has persistent debilitating pain and knee replacement would be a reasonable option at this point.  She would need to be cleared medically and from a oncology standpoint.  Her last oncology note is reviewed and there were potentially concerning findings on her CT scan and need for a follow-up PET scan.  If she is clear from a medical and oncology standpoint then she could proceed with knee replacement.

## 2025-06-13 ENCOUNTER — APPOINTMENT (OUTPATIENT)
Dept: ORTHOPEDIC SURGERY | Facility: CLINIC | Age: 75
End: 2025-06-13
Payer: MEDICARE

## 2025-06-19 ENCOUNTER — PREP FOR PROCEDURE (OUTPATIENT)
Dept: ORTHOPEDIC SURGERY | Facility: CLINIC | Age: 75
End: 2025-06-19
Payer: MEDICARE

## 2025-06-19 DIAGNOSIS — M17.12 ARTHRITIS OF LEFT KNEE: Primary | ICD-10-CM

## 2025-06-19 RX ORDER — SODIUM CHLORIDE, SODIUM LACTATE, POTASSIUM CHLORIDE, CALCIUM CHLORIDE 600; 310; 30; 20 MG/100ML; MG/100ML; MG/100ML; MG/100ML
100 INJECTION, SOLUTION INTRAVENOUS CONTINUOUS
OUTPATIENT
Start: 2025-06-19 | End: 2025-06-20

## 2025-06-19 RX ORDER — CEFAZOLIN SODIUM 2 G/100ML
2 INJECTION, SOLUTION INTRAVENOUS ONCE
OUTPATIENT
Start: 2025-06-19 | End: 2025-06-19

## 2025-06-23 PROBLEM — M17.12 ARTHRITIS OF LEFT KNEE: Status: ACTIVE | Noted: 2025-06-19

## 2025-07-01 ENCOUNTER — OFFICE VISIT (OUTPATIENT)
Dept: ORTHOPEDIC SURGERY | Facility: CLINIC | Age: 75
End: 2025-07-01
Payer: MEDICARE

## 2025-07-01 VITALS — BODY MASS INDEX: 30.36 KG/M2 | WEIGHT: 165 LBS | HEIGHT: 62 IN

## 2025-07-01 DIAGNOSIS — M17.12 PRIMARY OSTEOARTHRITIS OF LEFT KNEE: Primary | ICD-10-CM

## 2025-07-01 DIAGNOSIS — G89.29 CHRONIC PAIN OF LEFT KNEE: ICD-10-CM

## 2025-07-01 DIAGNOSIS — M25.562 CHRONIC PAIN OF LEFT KNEE: ICD-10-CM

## 2025-07-01 PROCEDURE — 1160F RVW MEDS BY RX/DR IN RCRD: CPT | Performed by: ORTHOPAEDIC SURGERY

## 2025-07-01 PROCEDURE — 1159F MED LIST DOCD IN RCRD: CPT | Performed by: ORTHOPAEDIC SURGERY

## 2025-07-01 PROCEDURE — 3008F BODY MASS INDEX DOCD: CPT | Performed by: ORTHOPAEDIC SURGERY

## 2025-07-01 PROCEDURE — 99214 OFFICE O/P EST MOD 30 MIN: CPT | Performed by: ORTHOPAEDIC SURGERY

## 2025-07-01 PROCEDURE — 1036F TOBACCO NON-USER: CPT | Performed by: ORTHOPAEDIC SURGERY

## 2025-07-01 PROCEDURE — 99212 OFFICE O/P EST SF 10 MIN: CPT | Performed by: ORTHOPAEDIC SURGERY

## 2025-07-01 NOTE — LETTER
July 1, 2025     Carl Melissa DO  [address] [facility]  [address]  [facility] [address]    Patient: Maddie Briseno   YOB: 1950   Date of Visit: 7/1/2025       Dear Dr. Carl Melissa DO:    Thank you for referring Maddie Briseno to me for evaluation. Below are my notes for this consultation.  If you have questions, please do not hesitate to call me. I look forward to following your patient along with you.       Sincerely,     Reed Francisco MD      CC: No Recipients  ______________________________________________________________________________________    75-year-old is seen with left knee pain.  She has been having persistent severe sharp shooting pain in the left knee.  Pain is worse with standing and walking and going up and down stairs and getting up and down from a chair in and out of a car.  She has had cortisone injection with minimal improvement.  She has had treatment for lymphoma and is not currently receiving chemotherapy.  Her oncologist has advised that she could proceed with knee replacement at this time.    Pleasant and no acute distress. Walks with antalgic gait. Stands with varus alignment of the left knee and neutral on the right. Left knee range of motion is 5-110°. The knee is stable to varus and valgus stress Lachman and posterior drawer. There is a mild effusion. There is generalized tenderness. Right knee range of motion is 0-120°. There is no effusion. The knee is stable to varus and valgus stress Lachman and posterior drawer. Both lower extremities are well perfused the skin is intact and muscle tone is adequate.    Multiple x-ray views of the left knee are personally reviewed and these demonstrate advanced degenerative changes with complete loss of joint space and osteophyte formation and subchondral sclerosis and cystic change.    The patient has undergone extensive conservative treatment but has persistent severe debilitating pain and advanced degenerative  changes on x-ray.  Treatment options including no treatment reviewed and the decision was made to proceed with left total knee arthroplasty.  The surgery and postoperative course were reviewed in detail.  Risks including but not limited to infection thromboembolus neurovascular injury fracture bleeding medical problems stiffness and implant failure or loosening were discussed and they understand this and have elected to proceed.  They will have medical clearance.  Avoidance of aggravating activities will be done in the meantime.  Intravenous TXA will be used at the time of the procedure.

## 2025-07-01 NOTE — PROGRESS NOTES
75-year-old is seen with left knee pain.  She has been having persistent severe sharp shooting pain in the left knee.  Pain is worse with standing and walking and going up and down stairs and getting up and down from a chair in and out of a car.  She has had cortisone injection with minimal improvement.  She has had treatment for lymphoma and is not currently receiving chemotherapy.  Her oncologist has advised that she could proceed with knee replacement at this time.    Pleasant and no acute distress. Walks with antalgic gait. Stands with varus alignment of the left knee and neutral on the right. Left knee range of motion is 5-110°. The knee is stable to varus and valgus stress Lachman and posterior drawer. There is a mild effusion. There is generalized tenderness. Right knee range of motion is 0-120°. There is no effusion. The knee is stable to varus and valgus stress Lachman and posterior drawer. Both lower extremities are well perfused the skin is intact and muscle tone is adequate.    Multiple x-ray views of the left knee are personally reviewed and these demonstrate advanced degenerative changes with complete loss of joint space and osteophyte formation and subchondral sclerosis and cystic change.    The patient has undergone extensive conservative treatment but has persistent severe debilitating pain and advanced degenerative changes on x-ray.  Treatment options including no treatment reviewed and the decision was made to proceed with left total knee arthroplasty.  The surgery and postoperative course were reviewed in detail.  Risks including but not limited to infection thromboembolus neurovascular injury fracture bleeding medical problems stiffness and implant failure or loosening were discussed and they understand this and have elected to proceed.  They will have medical clearance.  Avoidance of aggravating activities will be done in the meantime.  Intravenous TXA will be used at the time of the  procedure.

## 2025-07-15 ENCOUNTER — APPOINTMENT (OUTPATIENT)
Dept: PRIMARY CARE | Facility: CLINIC | Age: 75
End: 2025-07-15
Payer: MEDICARE

## 2025-07-15 VITALS — SYSTOLIC BLOOD PRESSURE: 120 MMHG | BODY MASS INDEX: 29.81 KG/M2 | WEIGHT: 163 LBS | DIASTOLIC BLOOD PRESSURE: 67 MMHG

## 2025-07-15 DIAGNOSIS — Z01.810 ENCOUNTER FOR PRE-OPERATIVE CARDIOVASCULAR CLEARANCE: Primary | ICD-10-CM

## 2025-07-15 PROCEDURE — G2211 COMPLEX E/M VISIT ADD ON: HCPCS | Performed by: STUDENT IN AN ORGANIZED HEALTH CARE EDUCATION/TRAINING PROGRAM

## 2025-07-15 PROCEDURE — 99214 OFFICE O/P EST MOD 30 MIN: CPT | Performed by: STUDENT IN AN ORGANIZED HEALTH CARE EDUCATION/TRAINING PROGRAM

## 2025-07-15 PROCEDURE — 1159F MED LIST DOCD IN RCRD: CPT | Performed by: STUDENT IN AN ORGANIZED HEALTH CARE EDUCATION/TRAINING PROGRAM

## 2025-07-15 PROCEDURE — 3078F DIAST BP <80 MM HG: CPT | Performed by: STUDENT IN AN ORGANIZED HEALTH CARE EDUCATION/TRAINING PROGRAM

## 2025-07-15 PROCEDURE — 1036F TOBACCO NON-USER: CPT | Performed by: STUDENT IN AN ORGANIZED HEALTH CARE EDUCATION/TRAINING PROGRAM

## 2025-07-15 PROCEDURE — 1160F RVW MEDS BY RX/DR IN RCRD: CPT | Performed by: STUDENT IN AN ORGANIZED HEALTH CARE EDUCATION/TRAINING PROGRAM

## 2025-07-15 PROCEDURE — 3074F SYST BP LT 130 MM HG: CPT | Performed by: STUDENT IN AN ORGANIZED HEALTH CARE EDUCATION/TRAINING PROGRAM

## 2025-07-15 ASSESSMENT — ENCOUNTER SYMPTOMS
ABDOMINAL PAIN: 0
APPETITE CHANGE: 0
HEMATOLOGIC/LYMPHATIC NEGATIVE: 1
ARTHRALGIAS: 1
CHILLS: 0
VOICE CHANGE: 0
PSYCHIATRIC NEGATIVE: 1
FLANK PAIN: 0
NECK STIFFNESS: 0
FATIGUE: 0
DIARRHEA: 0
ENDOCRINE NEGATIVE: 1
DIZZINESS: 0
CONSTITUTIONAL NEGATIVE: 1
PALPITATIONS: 0
JOINT SWELLING: 1
DIFFICULTY URINATING: 0
CONSTIPATION: 0
NECK PAIN: 0
UNEXPECTED WEIGHT CHANGE: 0
NUMBNESS: 0
ACTIVITY CHANGE: 0
SORE THROAT: 0
LIGHT-HEADEDNESS: 0
WEAKNESS: 0
RESPIRATORY NEGATIVE: 1
VOMITING: 0
NAUSEA: 0
HEMATURIA: 0
TROUBLE SWALLOWING: 0
EYES NEGATIVE: 1
FEVER: 0

## 2025-07-15 NOTE — PROGRESS NOTES
Aliza Perkins is 74 yo female with a PMHx of HTN, hyperlipidemia, CKD stage 3a, PFO, and OA here for clearance for left TKA procedure on the 23 of July. Pt reports no complaints and is overall healthy and active. She reports no problems with anesthesia including going to sleep and waking up from anesthesia. Overall patient is stable for surgery.      Meds  aspirin  coenzyme Q-10  Estrace cream  ibuprofen  simvastatin  traZODone   Water pill      Review of Systems   Constitutional: Negative.  Negative for activity change, appetite change, chills, fatigue, fever and unexpected weight change.   HENT:  Negative for congestion, sore throat, tinnitus, trouble swallowing and voice change.    Eyes: Negative.    Respiratory: Negative.     Cardiovascular:  Positive for leg swelling. Negative for chest pain and palpitations.   Gastrointestinal:  Negative for abdominal pain, constipation, diarrhea, nausea and vomiting.   Endocrine: Negative.    Genitourinary:  Negative for decreased urine volume, difficulty urinating, flank pain, hematuria and pelvic pain.   Musculoskeletal:  Positive for arthralgias, gait problem and joint swelling (left knee). Negative for neck pain and neck stiffness.   Skin: Negative.    Neurological:  Negative for dizziness, syncope, weakness, light-headedness and numbness.   Hematological: Negative.    Psychiatric/Behavioral: Negative.             Objective  Visit Vitals  /67        Physical Exam  Constitutional:       General: She is not in acute distress.     Appearance: She is normal weight. She is not ill-appearing, toxic-appearing or diaphoretic.   HENT:      Head: Normocephalic and atraumatic.   Eyes:      Pupils: Pupils are equal, round, and reactive to light.   Neck:      Vascular: No carotid bruit.   Cardiovascular:      Rate and Rhythm: Normal rate and regular rhythm.      Heart sounds: No murmur heard.     No friction rub. No gallop.   Pulmonary:      Effort: Pulmonary effort is  normal. No respiratory distress.      Breath sounds: Normal breath sounds. No stridor. No wheezing, rhonchi or rales.   Chest:      Chest wall: No tenderness.   Abdominal:      General: Abdomen is flat.   Musculoskeletal:      Cervical back: Normal range of motion and neck supple.      Right lower leg: No edema.      Left lower leg: Edema present.   Lymphadenopathy:      Cervical: No cervical adenopathy.   Skin:     General: Skin is warm and dry.   Neurological:      General: No focal deficit present.      Mental Status: She is oriented to person, place, and time.   Psychiatric:         Mood and Affect: Mood normal.         Behavior: Behavior normal.         Thought Content: Thought content normal.         Judgment: Judgment normal.          Assesment/Plan     Left TKA Clearance  Preadmission testing  Hold aspirin five days prior to procedure                Patient seen in conjunction with resident physician, noted significant knee arthritis, has failed nonoperative treatment.  Medically optimized for surgical procedure advised to hold aspirin 5 days before the procedure.  Will follow-up on preadmission testing, revised cardiac index score is noted to be 0, can perform >5 mets. medically optimized for surgery.

## 2025-07-16 PROCEDURE — RXMED WILLOW AMBULATORY MEDICATION CHARGE

## 2025-07-16 RX ORDER — CHLORHEXIDINE GLUCONATE 40 MG/ML
SOLUTION TOPICAL DAILY
Qty: 118 ML | Refills: 0 | Status: SHIPPED | OUTPATIENT
Start: 2025-07-16 | End: 2025-07-24 | Stop reason: HOSPADM

## 2025-07-16 RX ORDER — CHLORHEXIDINE GLUCONATE ORAL RINSE 1.2 MG/ML
15 SOLUTION DENTAL AS NEEDED
Qty: 473 ML | Refills: 0 | Status: SHIPPED | OUTPATIENT
Start: 2025-07-16 | End: 2025-07-24 | Stop reason: HOSPADM

## 2025-07-17 ENCOUNTER — PHARMACY VISIT (OUTPATIENT)
Dept: PHARMACY | Facility: CLINIC | Age: 75
End: 2025-07-17
Payer: COMMERCIAL

## 2025-07-17 ENCOUNTER — PRE-ADMISSION TESTING (OUTPATIENT)
Dept: PREADMISSION TESTING | Facility: HOSPITAL | Age: 75
End: 2025-07-17
Payer: MEDICARE

## 2025-07-17 VITALS
RESPIRATION RATE: 16 BRPM | HEIGHT: 62 IN | TEMPERATURE: 96.1 F | DIASTOLIC BLOOD PRESSURE: 71 MMHG | HEART RATE: 61 BPM | BODY MASS INDEX: 30.02 KG/M2 | SYSTOLIC BLOOD PRESSURE: 133 MMHG | OXYGEN SATURATION: 96 % | WEIGHT: 163.14 LBS

## 2025-07-17 DIAGNOSIS — R79.1 ABNORMAL COAGULATION PROFILE: ICD-10-CM

## 2025-07-17 DIAGNOSIS — Z01.818 PRE-OP TESTING: Primary | ICD-10-CM

## 2025-07-17 DIAGNOSIS — R79.9 ABNORMAL FINDING OF BLOOD CHEMISTRY, UNSPECIFIED: ICD-10-CM

## 2025-07-17 LAB
APTT PPP: 29 SECONDS (ref 26–36)
EST. AVERAGE GLUCOSE BLD GHB EST-MCNC: 108 MG/DL
HBA1C MFR BLD: 5.4 % (ref ?–5.7)
INR PPP: 0.9 (ref 0.9–1.1)
PROTHROMBIN TIME: 10.3 SECONDS (ref 9.8–12.4)

## 2025-07-17 PROCEDURE — 36415 COLL VENOUS BLD VENIPUNCTURE: CPT

## 2025-07-17 PROCEDURE — 87081 CULTURE SCREEN ONLY: CPT | Mod: PARLAB

## 2025-07-17 PROCEDURE — 83036 HEMOGLOBIN GLYCOSYLATED A1C: CPT | Mod: PARLAB

## 2025-07-17 PROCEDURE — 93005 ELECTROCARDIOGRAM TRACING: CPT

## 2025-07-17 PROCEDURE — 93010 ELECTROCARDIOGRAM REPORT: CPT | Performed by: INTERNAL MEDICINE

## 2025-07-17 PROCEDURE — 85610 PROTHROMBIN TIME: CPT

## 2025-07-17 ASSESSMENT — DUKE ACTIVITY SCORE INDEX (DASI)
CAN YOU RUN A SHORT DISTANCE: NO
CAN YOU TAKE CARE OF YOURSELF (EAT, DRESS, BATHE, OR USE TOILET): YES
CAN YOU PARTICIPATE IN MODERATE RECREATIONAL ACTIVITIES LIKE GOLF, BOWLING, DANCING, DOUBLES TENNIS OR THROWING A BASEBALL OR FOOTBALL: NO
CAN YOU DO YARD WORK LIKE RAKING LEAVES, WEEDING OR PUSHING A MOWER: YES
CAN YOU PARTICIPATE IN STRENOUS SPORTS LIKE SWIMMING, SINGLES TENNIS, FOOTBALL, BASKETBALL, OR SKIING: NO
CAN YOU DO HEAVY WORK AROUND THE HOUSE LIKE SCRUBBING FLOORS OR LIFTING AND MOVING HEAVY FURNITURE: YES
TOTAL_SCORE: 31.45
CAN YOU DO MODERATE WORK AROUND THE HOUSE LIKE VACUUMING, SWEEPING FLOORS OR CARRYING GROCERIES: YES
CAN YOU DO LIGHT WORK AROUND THE HOUSE LIKE DUSTING OR WASHING DISHES: YES
CAN YOU WALK A BLOCK OR TWO ON LEVEL GROUND: YES
CAN YOU CLIMB A FLIGHT OF STAIRS OR WALK UP A HILL: YES
CAN YOU WALK INDOORS, SUCH AS AROUND YOUR HOUSE: YES
CAN YOU HAVE SEXUAL RELATIONS: NO
DASI METS SCORE: 6.6

## 2025-07-17 NOTE — PROGRESS NOTES
Patient attended pre op educational TJR class.  RAPT score 8.    Discussed discharge plan with patient via telephone.  Plan discharge home on 7/24 with C follow up.  Patient has no preference in which HHC agency she utilizes and is aware referral will be made to [facility] for SOC 7/25.  Patient agreeable with discharge plan.

## 2025-07-17 NOTE — PREPROCEDURE INSTRUCTIONS
Medication List            Accurate as of July 17, 2025 10:26 AM. Always use your most recent med list.                aspirin 81 mg chewable tablet  Chew 1 tablet (81 mg) once daily.  Additional Medication Adjustments for Surgery: Take last dose 5 days before surgery  Notes to patient: per PCP recommendation     * chlorhexidine 0.12 % solution  Commonly known as: Peridex  Swish and Spit 15 mL by mouth once daily on the day before surgery and again the morning of surgery.     * Rosmery-Hex 4 % external liquid  Generic drug: chlorhexidine  Wash topically daily for 5 days prior to surgery with day 5 being morning of surgery. See directions from the hospital     coenzyme Q-10 100 mg capsule  Additional Medication Adjustments for Surgery: Take last dose 7 days before surgery     Estrace 0.01 % (0.1 mg/gram) vaginal cream  Generic drug: estradiol     ibuprofen 800 mg tablet  Additional Medication Adjustments for Surgery: Take last dose 7 days before surgery     simvastatin 40 mg tablet  Commonly known as: Zocor  Take 1 tablet (40 mg) by mouth once daily at bedtime.  Medication Adjustments for Surgery: Take/Use as prescribed     traZODone 50 mg tablet  Commonly known as: Desyrel  Take 1 tablet (50 mg) by mouth as needed at bedtime for sleep.  Medication Adjustments for Surgery: Take/Use as prescribed           * This list has 2 medication(s) that are the same as other medications prescribed for you. Read the directions carefully, and ask your doctor or other care provider to review them with you.                       PRE-OPERATIVE INSTRUCTIONS FOR SURGERY    *Do not eat anything after midnight the night of surgery.  This includes food of any kind (including hard candy, cough drops, mints).   You may have up to 13 ounces of clear liquid  until TWO hours prior to your scheduled arrival time.  Clear liquids include water, black tea/coffee, (no milk or cream) apple juice and electrolyte drinks (GATORADE).  You may chew gum  until TWO hours prior you your surgery/procedure.         *One of our staff members will call you ONE business day before your surgery, between 11 am-2 pm to let you know the time to arrive.  If you have not received a call by 2 pm, call [phone]    *When you arrive at the hospital-->GO TO Registration on the ground floor  *Stop smoking 24 hours prior to surgery.  No Marijuana, CBD Oil or Vaping for 48 hours  *No alcohol 24 hours prior to surgery  *You will need a responsible adult to drive you home  -No acrylic nails or nail polish on at least one fingernail, NO polish on toes for foot surgery  -You may be asked to remove your dentures, partial plate, eyeglasses or contact lenses before going to surgery.  Please bring a case for these items.  -Body piercings need to be removed.  Jewelry and valuables should be left at home.  -Put on loose,  comfortable, clean clothing, that will accommodate bandages        What is a home antibacterial shower?  START SHOWERS 7/19  This shower is a way of cleaning the skin with a germ killing solution before surgery.  The solution contains chlorhexidine, commonly known as CHG.  CHG is a skin cleanser with germ killing ability.  Let your doctor know if you are allergic to chlorhexidine.    Why do I need to take a preoperative antibacterial shower?  Skin is not sterile.  It is best to try to make your skin as free of germs as possible before surgery.  Proper cleansing with a germ killing soap before surgery can lower the number of germs on your skin.  This helps to reduce the risk of infection at the surgical site.  Following the instructions listed below will help you prepare your skin for surgery.      How do I use the solution?    Steps: Begin using your CHG soap 5 days before your surgery on ______7/23/2025____________.    *First, wash and rinse your hair using the CHG soap.  Keep CHG soap away from ear canals and eyes.   Rinse completely, do not condition.  Hair extensions  should be removed.    *Wash your face with your normal soap and rinse.   *Apply the CHG solution to a clean wet washcloth.  Turn the water off or move away from the water spray to avoid premature rinsing of the CHG soap as you are applying.  Firmly lather your entire body from the neck down.  Do not use on your face.    *Pay special attention to the area(s) where your incision(s) will be located unless they are on your face.  Avoid scrubbing your skin too hard.  The important part is to have the CHG soap sit on your skin for 3 minutes.   *When the 3 minutes are up, turn on the water and rinse the CHG solution off your body completely.  *Do not wash with regular soap after you  have  used the CHG soap solution.  *Pat  yourself dry with a clean, freshly laundered towel.  *Do not apply powders, deodorants or lotions.  *Dress in clean freshly laundered night clothes.    *Be sure to sleep with clean freshly laundered sheets.    *Be aware the CHG will cause stains on fabrics; if you wash them with bleach after use.  Rinse your washcloth and other linens that have contact with CHG completely.  Use only non-chlorine detergents to launder the items  used.  *The morning of surgery is the fifth day.  Repeat the above steps and dress in clean comfortable clothing.     What is oral/dental rinse?  It is mouthwash.  It is a way of cleaning the he mouth with a germ-killing solution before your surgery.  The solution contains chlorhexidine, commonly known as CHG.  It is used inside the mouth to kill a bacteria known as Staphylococcus aureus.  Let your doctor know if you are allergic to Chlorhexidine.    Why do I need to use CHG oral/ dental rinse?  The CHG oral/dental rinse helps to kill bacteria in your mouth know as Staphylococcus aureus.  This reduces the risk of infection at the surgical site.    Using your CHG oral/dental rinse    STEPS:    Use your CHG oral/dental rinse after you brush your teeth the night before (at bedtime)  and the morning of your surgery.  Follow all the directions on your prescription label.  *Use the cap on the container to measure 15 ml  *Swish (gargle if you can) the mouthwash in your mouth for at least 30 seconds, (do not swallow) and spit out  *After you use your CHG rinse, do not rinse your mouth with water, drink or eat.  Please refer to the prescription label for the appropriate time to resume oral intake.    What side effects might I have using the CHG oral/dental rinse?  CHG rinse will stick you plaque on the teeth.  Brush and floss just before use.   Teeth brushing will help avoid staining of the plaque during  use.  Teeth brushing will help avoid staining of plaque during  use.      *If you have any further questions about your pre-op instructions,  not mentioned in this handout, then call [phone]*    What you may be asked to bring to surgery:  ___Crutches, walker  ___CPAP machine  ___Urine specimen

## 2025-07-19 LAB — STAPHYLOCOCCUS SPEC CULT: NORMAL

## 2025-07-22 ENCOUNTER — HOME HEALTH ADMISSION (OUTPATIENT)
Dept: HOME HEALTH SERVICES | Facility: HOME HEALTH | Age: 75
End: 2025-07-22
Payer: MEDICARE

## 2025-07-22 LAB
ATRIAL RATE: 54 BPM
P AXIS: 45 DEGREES
P OFFSET: 196 MS
P ONSET: 142 MS
PR INTERVAL: 164 MS
Q ONSET: 224 MS
QRS COUNT: 9 BEATS
QRS DURATION: 74 MS
QT INTERVAL: 458 MS
QTC CALCULATION(BAZETT): 434 MS
QTC FREDERICIA: 441 MS
R AXIS: -5 DEGREES
T AXIS: 13 DEGREES
T OFFSET: 453 MS
VENTRICULAR RATE: 54 BPM

## 2025-07-23 ENCOUNTER — APPOINTMENT (OUTPATIENT)
Dept: RADIOLOGY | Facility: HOSPITAL | Age: 75
End: 2025-07-23
Payer: MEDICARE

## 2025-07-23 ENCOUNTER — ANESTHESIA (OUTPATIENT)
Dept: OPERATING ROOM | Facility: HOSPITAL | Age: 75
End: 2025-07-23
Payer: MEDICARE

## 2025-07-23 ENCOUNTER — ANESTHESIA EVENT (OUTPATIENT)
Dept: OPERATING ROOM | Facility: HOSPITAL | Age: 75
End: 2025-07-23
Payer: MEDICARE

## 2025-07-23 ENCOUNTER — HOSPITAL ENCOUNTER (OUTPATIENT)
Facility: HOSPITAL | Age: 75
Discharge: HOME | End: 2025-07-24
Attending: ORTHOPAEDIC SURGERY | Admitting: ORTHOPAEDIC SURGERY
Payer: MEDICARE

## 2025-07-23 DIAGNOSIS — I10 PRIMARY HYPERTENSION: ICD-10-CM

## 2025-07-23 DIAGNOSIS — M17.12 ARTHRITIS OF LEFT KNEE: Primary | ICD-10-CM

## 2025-07-23 PROCEDURE — 7100000001 HC RECOVERY ROOM TIME - INITIAL BASE CHARGE: Performed by: ORTHOPAEDIC SURGERY

## 2025-07-23 PROCEDURE — 99100 ANES PT EXTEME AGE<1 YR&>70: CPT | Performed by: ANESTHESIOLOGY

## 2025-07-23 PROCEDURE — 99222 1ST HOSP IP/OBS MODERATE 55: CPT

## 2025-07-23 PROCEDURE — 7100000011 HC EXTENDED STAY RECOVERY HOURLY - NURSING UNIT

## 2025-07-23 PROCEDURE — 2500000004 HC RX 250 GENERAL PHARMACY W/ HCPCS (ALT 636 FOR OP/ED)

## 2025-07-23 PROCEDURE — 73560 X-RAY EXAM OF KNEE 1 OR 2: CPT | Mod: LEFT SIDE | Performed by: RADIOLOGY

## 2025-07-23 PROCEDURE — 73560 X-RAY EXAM OF KNEE 1 OR 2: CPT | Mod: LT

## 2025-07-23 PROCEDURE — 2500000001 HC RX 250 WO HCPCS SELF ADMINISTERED DRUGS (ALT 637 FOR MEDICARE OP): Performed by: ANESTHESIOLOGY

## 2025-07-23 PROCEDURE — 3700000002 HC GENERAL ANESTHESIA TIME - EACH INCREMENTAL 1 MINUTE: Performed by: ORTHOPAEDIC SURGERY

## 2025-07-23 PROCEDURE — 3700000001 HC GENERAL ANESTHESIA TIME - INITIAL BASE CHARGE: Performed by: ORTHOPAEDIC SURGERY

## 2025-07-23 PROCEDURE — 97161 PT EVAL LOW COMPLEX 20 MIN: CPT | Mod: GP

## 2025-07-23 PROCEDURE — 64447 NJX AA&/STRD FEMORAL NRV IMG: CPT | Performed by: ANESTHESIOLOGY

## 2025-07-23 PROCEDURE — 27447 TOTAL KNEE ARTHROPLASTY: CPT | Performed by: ORTHOPAEDIC SURGERY

## 2025-07-23 PROCEDURE — 3600000005 HC OR TIME - INITIAL BASE CHARGE - PROCEDURE LEVEL FIVE: Performed by: ORTHOPAEDIC SURGERY

## 2025-07-23 PROCEDURE — 2500000001 HC RX 250 WO HCPCS SELF ADMINISTERED DRUGS (ALT 637 FOR MEDICARE OP)

## 2025-07-23 PROCEDURE — C1776 JOINT DEVICE (IMPLANTABLE): HCPCS | Performed by: ORTHOPAEDIC SURGERY

## 2025-07-23 PROCEDURE — 2500000005 HC RX 250 GENERAL PHARMACY W/O HCPCS: Performed by: ORTHOPAEDIC SURGERY

## 2025-07-23 PROCEDURE — 2780000003 HC OR 278 NO HCPCS: Performed by: ORTHOPAEDIC SURGERY

## 2025-07-23 PROCEDURE — 2500000004 HC RX 250 GENERAL PHARMACY W/ HCPCS (ALT 636 FOR OP/ED): Performed by: ANESTHESIOLOGY

## 2025-07-23 PROCEDURE — 97165 OT EVAL LOW COMPLEX 30 MIN: CPT | Mod: GO

## 2025-07-23 PROCEDURE — 2500000002 HC RX 250 W HCPCS SELF ADMINISTERED DRUGS (ALT 637 FOR MEDICARE OP, ALT 636 FOR OP/ED)

## 2025-07-23 PROCEDURE — 3600000010 HC OR TIME - EACH INCREMENTAL 1 MINUTE - PROCEDURE LEVEL FIVE: Performed by: ORTHOPAEDIC SURGERY

## 2025-07-23 PROCEDURE — 2720000007 HC OR 272 NO HCPCS: Performed by: ORTHOPAEDIC SURGERY

## 2025-07-23 PROCEDURE — C1713 ANCHOR/SCREW BN/BN,TIS/BN: HCPCS | Performed by: ORTHOPAEDIC SURGERY

## 2025-07-23 PROCEDURE — A27447 PR TOTAL KNEE ARTHROPLASTY: Performed by: ANESTHESIOLOGY

## 2025-07-23 PROCEDURE — 7100000002 HC RECOVERY ROOM TIME - EACH INCREMENTAL 1 MINUTE: Performed by: ORTHOPAEDIC SURGERY

## 2025-07-23 PROCEDURE — 2500000005 HC RX 250 GENERAL PHARMACY W/O HCPCS

## 2025-07-23 DEVICE — ATTUNE PATELLA MEDIALIZED DOME 32MM CEMENTED AOX
Type: IMPLANTABLE DEVICE | Site: KNEE | Status: FUNCTIONAL
Brand: ATTUNE

## 2025-07-23 DEVICE — ATTUNE KNEE SYSTEM TIBIAL INSERT FIXED BEARING CRUCIATE RETAINING 5 5MM AOX
Type: IMPLANTABLE DEVICE | Site: KNEE | Status: FUNCTIONAL
Brand: ATTUNE

## 2025-07-23 DEVICE — IMPLANTABLE DEVICE
Type: IMPLANTABLE DEVICE | Site: KNEE | Status: FUNCTIONAL
Brand: BIOMET® BONE CEMENT R

## 2025-07-23 DEVICE — ATTUNE KNEE SYSTEM TIBIAL BASE FIXED BEARING SIZE 4 CEMENTED
Type: IMPLANTABLE DEVICE | Site: KNEE | Status: FUNCTIONAL
Brand: ATTUNE

## 2025-07-23 DEVICE — ATTUNE KNEE SYSTEM FEMORAL CRUCIATE RETAINING SIZE 5 LEFT CEMENTED
Type: IMPLANTABLE DEVICE | Site: KNEE | Status: FUNCTIONAL
Brand: ATTUNE

## 2025-07-23 RX ORDER — CEFAZOLIN SODIUM 2 G/50ML
2 SOLUTION INTRAVENOUS ONCE
Status: COMPLETED | OUTPATIENT
Start: 2025-07-23 | End: 2025-07-23

## 2025-07-23 RX ORDER — TRAMADOL HYDROCHLORIDE 50 MG/1
50 TABLET, FILM COATED ORAL ONCE
Status: COMPLETED | OUTPATIENT
Start: 2025-07-23 | End: 2025-07-23

## 2025-07-23 RX ORDER — ACETAMINOPHEN 325 MG/1
650 TABLET ORAL ONCE
Status: COMPLETED | OUTPATIENT
Start: 2025-07-23 | End: 2025-07-23

## 2025-07-23 RX ORDER — SODIUM CHLORIDE, SODIUM LACTATE, POTASSIUM CHLORIDE, CALCIUM CHLORIDE 600; 310; 30; 20 MG/100ML; MG/100ML; MG/100ML; MG/100ML
100 INJECTION, SOLUTION INTRAVENOUS CONTINUOUS
Status: DISCONTINUED | OUTPATIENT
Start: 2025-07-23 | End: 2025-07-24 | Stop reason: HOSPADM

## 2025-07-23 RX ORDER — CYCLOBENZAPRINE HCL 10 MG
10 TABLET ORAL 3 TIMES DAILY PRN
Status: DISCONTINUED | OUTPATIENT
Start: 2025-07-23 | End: 2025-07-24 | Stop reason: HOSPADM

## 2025-07-23 RX ORDER — ONDANSETRON HYDROCHLORIDE 2 MG/ML
4 INJECTION, SOLUTION INTRAVENOUS EVERY 8 HOURS PRN
Status: DISCONTINUED | OUTPATIENT
Start: 2025-07-23 | End: 2025-07-24 | Stop reason: HOSPADM

## 2025-07-23 RX ORDER — PROMETHAZINE HYDROCHLORIDE 25 MG/1
25 SUPPOSITORY RECTAL EVERY 12 HOURS PRN
Status: DISCONTINUED | OUTPATIENT
Start: 2025-07-23 | End: 2025-07-24 | Stop reason: HOSPADM

## 2025-07-23 RX ORDER — ACETAMINOPHEN 325 MG/1
650 TABLET ORAL EVERY 4 HOURS PRN
Status: DISCONTINUED | OUTPATIENT
Start: 2025-07-23 | End: 2025-07-23 | Stop reason: HOSPADM

## 2025-07-23 RX ORDER — PROMETHAZINE HYDROCHLORIDE 25 MG/1
25 TABLET ORAL EVERY 6 HOURS PRN
Status: DISCONTINUED | OUTPATIENT
Start: 2025-07-23 | End: 2025-07-24 | Stop reason: HOSPADM

## 2025-07-23 RX ORDER — PROPOFOL 10 MG/ML
INJECTION, EMULSION INTRAVENOUS AS NEEDED
Status: DISCONTINUED | OUTPATIENT
Start: 2025-07-23 | End: 2025-07-23

## 2025-07-23 RX ORDER — HYDRALAZINE HYDROCHLORIDE 20 MG/ML
10 INJECTION INTRAMUSCULAR; INTRAVENOUS ONCE
Status: COMPLETED | OUTPATIENT
Start: 2025-07-23 | End: 2025-07-23

## 2025-07-23 RX ORDER — ALBUTEROL SULFATE 0.83 MG/ML
2.5 SOLUTION RESPIRATORY (INHALATION) ONCE AS NEEDED
Status: DISCONTINUED | OUTPATIENT
Start: 2025-07-23 | End: 2025-07-23 | Stop reason: HOSPADM

## 2025-07-23 RX ORDER — CELECOXIB 100 MG/1
100 CAPSULE ORAL ONCE
Status: COMPLETED | OUTPATIENT
Start: 2025-07-23 | End: 2025-07-23

## 2025-07-23 RX ORDER — OXYCODONE HYDROCHLORIDE 5 MG/1
10 TABLET ORAL EVERY 4 HOURS PRN
Status: DISCONTINUED | OUTPATIENT
Start: 2025-07-23 | End: 2025-07-24 | Stop reason: HOSPADM

## 2025-07-23 RX ORDER — SODIUM CHLORIDE 0.9 G/100ML
INJECTION, SOLUTION IRRIGATION AS NEEDED
Status: DISCONTINUED | OUTPATIENT
Start: 2025-07-23 | End: 2025-07-23 | Stop reason: HOSPADM

## 2025-07-23 RX ORDER — SIMVASTATIN 20 MG/1
40 TABLET, FILM COATED ORAL NIGHTLY
Status: DISCONTINUED | OUTPATIENT
Start: 2025-07-23 | End: 2025-07-24 | Stop reason: HOSPADM

## 2025-07-23 RX ORDER — CEFAZOLIN SODIUM 2 G/50ML
2 SOLUTION INTRAVENOUS EVERY 8 HOURS
Status: COMPLETED | OUTPATIENT
Start: 2025-07-23 | End: 2025-07-24

## 2025-07-23 RX ORDER — HYDROMORPHONE HYDROCHLORIDE 1 MG/ML
INJECTION, SOLUTION INTRAMUSCULAR; INTRAVENOUS; SUBCUTANEOUS AS NEEDED
Status: DISCONTINUED | OUTPATIENT
Start: 2025-07-23 | End: 2025-07-23

## 2025-07-23 RX ORDER — PHENYLEPHRINE HCL IN 0.9% NACL 1 MG/10 ML
SYRINGE (ML) INTRAVENOUS AS NEEDED
Status: DISCONTINUED | OUTPATIENT
Start: 2025-07-23 | End: 2025-07-23

## 2025-07-23 RX ORDER — DOCUSATE SODIUM 100 MG/1
100 CAPSULE, LIQUID FILLED ORAL 2 TIMES DAILY
Status: DISCONTINUED | OUTPATIENT
Start: 2025-07-23 | End: 2025-07-24 | Stop reason: HOSPADM

## 2025-07-23 RX ORDER — TRAZODONE HYDROCHLORIDE 50 MG/1
50 TABLET ORAL NIGHTLY PRN
Status: DISCONTINUED | OUTPATIENT
Start: 2025-07-23 | End: 2025-07-24 | Stop reason: HOSPADM

## 2025-07-23 RX ORDER — DEXAMETHASONE SODIUM PHOSPHATE 10 MG/ML
6 INJECTION INTRAMUSCULAR; INTRAVENOUS ONCE
Status: DISCONTINUED | OUTPATIENT
Start: 2025-07-23 | End: 2025-07-23 | Stop reason: HOSPADM

## 2025-07-23 RX ORDER — ONDANSETRON 4 MG/1
4 TABLET, FILM COATED ORAL EVERY 8 HOURS PRN
Status: DISCONTINUED | OUTPATIENT
Start: 2025-07-23 | End: 2025-07-24 | Stop reason: HOSPADM

## 2025-07-23 RX ORDER — ACETAMINOPHEN 325 MG/1
650 TABLET ORAL EVERY 6 HOURS SCHEDULED
Status: DISCONTINUED | OUTPATIENT
Start: 2025-07-23 | End: 2025-07-24 | Stop reason: HOSPADM

## 2025-07-23 RX ORDER — FENTANYL CITRATE 50 UG/ML
INJECTION, SOLUTION INTRAMUSCULAR; INTRAVENOUS AS NEEDED
Status: DISCONTINUED | OUTPATIENT
Start: 2025-07-23 | End: 2025-07-23

## 2025-07-23 RX ORDER — OXYCODONE HYDROCHLORIDE 5 MG/1
2.5 TABLET ORAL EVERY 4 HOURS PRN
Status: DISCONTINUED | OUTPATIENT
Start: 2025-07-23 | End: 2025-07-24 | Stop reason: HOSPADM

## 2025-07-23 RX ORDER — MIDAZOLAM HYDROCHLORIDE 1 MG/ML
2 INJECTION, SOLUTION INTRAMUSCULAR; INTRAVENOUS ONCE
Status: COMPLETED | OUTPATIENT
Start: 2025-07-23 | End: 2025-07-23

## 2025-07-23 RX ORDER — GABAPENTIN 300 MG/1
300 CAPSULE ORAL ONCE
Status: COMPLETED | OUTPATIENT
Start: 2025-07-23 | End: 2025-07-23

## 2025-07-23 RX ORDER — ONDANSETRON HYDROCHLORIDE 2 MG/ML
INJECTION, SOLUTION INTRAVENOUS AS NEEDED
Status: DISCONTINUED | OUTPATIENT
Start: 2025-07-23 | End: 2025-07-23

## 2025-07-23 RX ORDER — NALOXONE HYDROCHLORIDE 1 MG/ML
0.2 INJECTION INTRAMUSCULAR; INTRAVENOUS; SUBCUTANEOUS EVERY 5 MIN PRN
Status: DISCONTINUED | OUTPATIENT
Start: 2025-07-23 | End: 2025-07-24 | Stop reason: HOSPADM

## 2025-07-23 RX ORDER — SUCCINYLCHOLINE CHLORIDE 20 MG/ML INJECTION SOLUTION
SOLUTION AS NEEDED
Status: DISCONTINUED | OUTPATIENT
Start: 2025-07-23 | End: 2025-07-23

## 2025-07-23 RX ORDER — OXYCODONE HYDROCHLORIDE 5 MG/1
5 TABLET ORAL EVERY 6 HOURS PRN
Status: DISCONTINUED | OUTPATIENT
Start: 2025-07-23 | End: 2025-07-24 | Stop reason: HOSPADM

## 2025-07-23 RX ORDER — POLYETHYLENE GLYCOL 3350 17 G/17G
17 POWDER, FOR SOLUTION ORAL DAILY
Status: DISCONTINUED | OUTPATIENT
Start: 2025-07-23 | End: 2025-07-24 | Stop reason: HOSPADM

## 2025-07-23 RX ORDER — SODIUM CHLORIDE, SODIUM LACTATE, POTASSIUM CHLORIDE, CALCIUM CHLORIDE 600; 310; 30; 20 MG/100ML; MG/100ML; MG/100ML; MG/100ML
100 INJECTION, SOLUTION INTRAVENOUS CONTINUOUS
Status: ACTIVE | OUTPATIENT
Start: 2025-07-23 | End: 2025-07-24

## 2025-07-23 RX ORDER — ROCURONIUM BROMIDE 10 MG/ML
INJECTION, SOLUTION INTRAVENOUS AS NEEDED
Status: DISCONTINUED | OUTPATIENT
Start: 2025-07-23 | End: 2025-07-23

## 2025-07-23 RX ORDER — TRANEXAMIC ACID 10 MG/ML
1000 INJECTION, SOLUTION INTRAVENOUS
Status: COMPLETED | OUTPATIENT
Start: 2025-07-23 | End: 2025-07-23

## 2025-07-23 RX ORDER — LIDOCAINE HCL/PF 100 MG/5ML
SYRINGE (ML) INTRAVENOUS AS NEEDED
Status: DISCONTINUED | OUTPATIENT
Start: 2025-07-23 | End: 2025-07-23

## 2025-07-23 RX ORDER — TRAMADOL HYDROCHLORIDE 50 MG/1
50 TABLET, FILM COATED ORAL EVERY 6 HOURS PRN
Status: DISCONTINUED | OUTPATIENT
Start: 2025-07-23 | End: 2025-07-24 | Stop reason: HOSPADM

## 2025-07-23 RX ORDER — ONDANSETRON HYDROCHLORIDE 2 MG/ML
4 INJECTION, SOLUTION INTRAVENOUS ONCE AS NEEDED
Status: DISCONTINUED | OUTPATIENT
Start: 2025-07-23 | End: 2025-07-23 | Stop reason: HOSPADM

## 2025-07-23 RX ADMIN — HYDROMORPHONE HYDROCHLORIDE 0.5 MG: 1 INJECTION, SOLUTION INTRAMUSCULAR; INTRAVENOUS; SUBCUTANEOUS at 08:45

## 2025-07-23 RX ADMIN — ACETAMINOPHEN 650 MG: 325 TABLET ORAL at 17:20

## 2025-07-23 RX ADMIN — PROPOFOL 150 MG: 10 INJECTION, EMULSION INTRAVENOUS at 07:37

## 2025-07-23 RX ADMIN — Medication 200 MG: at 07:37

## 2025-07-23 RX ADMIN — ROCURONIUM BROMIDE 10 MG: 10 INJECTION, SOLUTION INTRAVENOUS at 07:37

## 2025-07-23 RX ADMIN — HYDROMORPHONE HYDROCHLORIDE 0.5 MG: 1 INJECTION, SOLUTION INTRAMUSCULAR; INTRAVENOUS; SUBCUTANEOUS at 13:58

## 2025-07-23 RX ADMIN — OXYCODONE HYDROCHLORIDE 5 MG: 5 TABLET ORAL at 11:45

## 2025-07-23 RX ADMIN — CELECOXIB 100 MG: 100 CAPSULE ORAL at 07:06

## 2025-07-23 RX ADMIN — ROCURONIUM BROMIDE 40 MG: 10 INJECTION, SOLUTION INTRAVENOUS at 07:50

## 2025-07-23 RX ADMIN — SODIUM CHLORIDE, SODIUM LACTATE, POTASSIUM CHLORIDE, AND CALCIUM CHLORIDE 100 ML/HR: .6; .31; .03; .02 INJECTION, SOLUTION INTRAVENOUS at 06:58

## 2025-07-23 RX ADMIN — FENTANYL CITRATE 50 MCG: 50 INJECTION, SOLUTION INTRAMUSCULAR; INTRAVENOUS at 08:11

## 2025-07-23 RX ADMIN — HYDROMORPHONE HYDROCHLORIDE 0.5 MG: 1 INJECTION, SOLUTION INTRAMUSCULAR; INTRAVENOUS; SUBCUTANEOUS at 09:29

## 2025-07-23 RX ADMIN — SUGAMMADEX 200 MG: 100 INJECTION, SOLUTION INTRAVENOUS at 09:29

## 2025-07-23 RX ADMIN — HYDROMORPHONE HYDROCHLORIDE 0.2 MG: 1 INJECTION, SOLUTION INTRAMUSCULAR; INTRAVENOUS; SUBCUTANEOUS at 10:47

## 2025-07-23 RX ADMIN — GABAPENTIN 300 MG: 300 CAPSULE ORAL at 07:06

## 2025-07-23 RX ADMIN — TRANEXAMIC ACID 1000 MG: 10 INJECTION, SOLUTION INTRAVENOUS at 08:41

## 2025-07-23 RX ADMIN — TRAMADOL HYDROCHLORIDE 50 MG: 50 TABLET, COATED ORAL at 07:06

## 2025-07-23 RX ADMIN — FENTANYL CITRATE 50 MCG: 50 INJECTION, SOLUTION INTRAMUSCULAR; INTRAVENOUS at 08:13

## 2025-07-23 RX ADMIN — HYDRALAZINE HYDROCHLORIDE 10 MG: 20 INJECTION INTRAMUSCULAR; INTRAVENOUS at 10:26

## 2025-07-23 RX ADMIN — Medication 100 MCG: at 09:05

## 2025-07-23 RX ADMIN — CEFAZOLIN SODIUM 2 G: 2 SOLUTION INTRAVENOUS at 07:45

## 2025-07-23 RX ADMIN — OXYCODONE HYDROCHLORIDE 10 MG: 5 TABLET ORAL at 22:39

## 2025-07-23 RX ADMIN — ACETAMINOPHEN 650 MG: 325 TABLET ORAL at 11:45

## 2025-07-23 RX ADMIN — ONDANSETRON 4 MG: 2 INJECTION, SOLUTION INTRAMUSCULAR; INTRAVENOUS at 09:06

## 2025-07-23 RX ADMIN — MIDAZOLAM 2 MG: 1 INJECTION INTRAMUSCULAR; INTRAVENOUS at 07:19

## 2025-07-23 RX ADMIN — SODIUM CHLORIDE, SODIUM LACTATE, POTASSIUM CHLORIDE, AND CALCIUM CHLORIDE 100 ML/HR: .6; .31; .03; .02 INJECTION, SOLUTION INTRAVENOUS at 11:53

## 2025-07-23 RX ADMIN — OXYCODONE HYDROCHLORIDE 10 MG: 5 TABLET ORAL at 18:16

## 2025-07-23 RX ADMIN — DOCUSATE SODIUM 100 MG: 100 CAPSULE, LIQUID FILLED ORAL at 11:45

## 2025-07-23 RX ADMIN — ROCURONIUM BROMIDE 20 MG: 10 INJECTION, SOLUTION INTRAVENOUS at 08:27

## 2025-07-23 RX ADMIN — Medication 100 MCG: at 09:19

## 2025-07-23 RX ADMIN — DEXAMETHASONE SODIUM PHOSPHATE 4 MG: 4 INJECTION, SOLUTION INTRAMUSCULAR; INTRAVENOUS at 08:04

## 2025-07-23 RX ADMIN — POVIDONE-IODINE 1 APPLICATION: 5 SOLUTION TOPICAL at 06:57

## 2025-07-23 RX ADMIN — CEFAZOLIN SODIUM 2 G: 2 SOLUTION INTRAVENOUS at 16:07

## 2025-07-23 RX ADMIN — TRANEXAMIC ACID 1000 MG: 10 INJECTION, SOLUTION INTRAVENOUS at 07:47

## 2025-07-23 RX ADMIN — LIDOCAINE HYDROCHLORIDE 60 MG: 20 INJECTION INTRAVENOUS at 07:37

## 2025-07-23 RX ADMIN — ACETAMINOPHEN 650 MG: 325 TABLET ORAL at 07:06

## 2025-07-23 RX ADMIN — DOCUSATE SODIUM 100 MG: 100 CAPSULE, LIQUID FILLED ORAL at 22:38

## 2025-07-23 RX ADMIN — SIMVASTATIN 40 MG: 20 TABLET, FILM COATED ORAL at 22:38

## 2025-07-23 RX ADMIN — FENTANYL CITRATE 100 MCG: 50 INJECTION, SOLUTION INTRAMUSCULAR; INTRAVENOUS at 07:37

## 2025-07-23 RX ADMIN — PROPOFOL 50 MG: 10 INJECTION, EMULSION INTRAVENOUS at 08:11

## 2025-07-23 RX ADMIN — POLYETHYLENE GLYCOL 3350 17 G: 17 POWDER, FOR SOLUTION ORAL at 11:44

## 2025-07-23 SDOH — ECONOMIC STABILITY: FOOD INSECURITY: WITHIN THE PAST 12 MONTHS, THE FOOD YOU BOUGHT JUST DIDN'T LAST AND YOU DIDN'T HAVE MONEY TO GET MORE.: NEVER TRUE

## 2025-07-23 SDOH — ECONOMIC STABILITY: FOOD INSECURITY: WITHIN THE PAST 12 MONTHS, YOU WORRIED THAT YOUR FOOD WOULD RUN OUT BEFORE YOU GOT THE MONEY TO BUY MORE.: NEVER TRUE

## 2025-07-23 SDOH — SOCIAL STABILITY: SOCIAL INSECURITY: ARE YOU OR HAVE YOU BEEN THREATENED OR ABUSED PHYSICALLY, EMOTIONALLY, OR SEXUALLY BY ANYONE?: NO

## 2025-07-23 SDOH — ECONOMIC STABILITY: HOUSING INSECURITY: DO YOU FEEL UNSAFE GOING BACK TO THE PLACE WHERE YOU LIVE?: NO

## 2025-07-23 SDOH — ECONOMIC STABILITY: FOOD INSECURITY: HOW HARD IS IT FOR YOU TO PAY FOR THE VERY BASICS LIKE FOOD, HOUSING, MEDICAL CARE, AND HEATING?: NOT HARD AT ALL

## 2025-07-23 SDOH — SOCIAL STABILITY: SOCIAL INSECURITY: WITHIN THE LAST YEAR, HAVE YOU BEEN HUMILIATED OR EMOTIONALLY ABUSED IN OTHER WAYS BY YOUR PARTNER OR EX-PARTNER?: NO

## 2025-07-23 SDOH — SOCIAL STABILITY: SOCIAL INSECURITY
WITHIN THE LAST YEAR, HAVE YOU BEEN RAPED OR FORCED TO HAVE ANY KIND OF SEXUAL ACTIVITY BY YOUR PARTNER OR EX-PARTNER?: NO

## 2025-07-23 SDOH — SOCIAL STABILITY: SOCIAL INSECURITY: DOES ANYONE TRY TO KEEP YOU FROM HAVING/CONTACTING OTHER FRIENDS OR DOING THINGS OUTSIDE YOUR HOME?: NO

## 2025-07-23 SDOH — SOCIAL STABILITY: SOCIAL INSECURITY: WERE YOU ABLE TO COMPLETE ALL THE BEHAVIORAL HEALTH SCREENINGS?: YES

## 2025-07-23 SDOH — SOCIAL STABILITY: SOCIAL INSECURITY: WITHIN THE LAST YEAR, HAVE YOU BEEN AFRAID OF YOUR PARTNER OR EX-PARTNER?: NO

## 2025-07-23 SDOH — ECONOMIC STABILITY: INCOME INSECURITY: IN THE PAST 12 MONTHS HAS THE ELECTRIC, GAS, OIL, OR WATER COMPANY THREATENED TO SHUT OFF SERVICES IN YOUR HOME?: NO

## 2025-07-23 SDOH — SOCIAL STABILITY: SOCIAL INSECURITY: HAVE YOU HAD ANY THOUGHTS OF HARMING ANYONE ELSE?: NO

## 2025-07-23 SDOH — SOCIAL STABILITY: SOCIAL INSECURITY: HAS ANYONE EVER THREATENED TO HURT YOUR FAMILY OR YOUR PETS?: NO

## 2025-07-23 SDOH — SOCIAL STABILITY: SOCIAL INSECURITY: DO YOU FEEL ANYONE HAS EXPLOITED OR TAKEN ADVANTAGE OF YOU FINANCIALLY OR OF YOUR PERSONAL PROPERTY?: NO

## 2025-07-23 SDOH — SOCIAL STABILITY: SOCIAL INSECURITY
WITHIN THE LAST YEAR, HAVE YOU BEEN KICKED, HIT, SLAPPED, OR OTHERWISE PHYSICALLY HURT BY YOUR PARTNER OR EX-PARTNER?: NO

## 2025-07-23 SDOH — HEALTH STABILITY: MENTAL HEALTH: CURRENT SMOKER: 0

## 2025-07-23 SDOH — ECONOMIC STABILITY: HOUSING INSECURITY: AT ANY TIME IN THE PAST 12 MONTHS, WERE YOU HOMELESS OR LIVING IN A SHELTER (INCLUDING NOW)?: NO

## 2025-07-23 SDOH — SOCIAL STABILITY: SOCIAL INSECURITY: HAVE YOU HAD THOUGHTS OF HARMING ANYONE ELSE?: NO

## 2025-07-23 SDOH — ECONOMIC STABILITY: HOUSING INSECURITY: IN THE LAST 12 MONTHS, WAS THERE A TIME WHEN YOU WERE NOT ABLE TO PAY THE MORTGAGE OR RENT ON TIME?: NO

## 2025-07-23 SDOH — SOCIAL STABILITY: SOCIAL INSECURITY: DO YOU FEEL UNSAFE GOING BACK TO THE PLACE WHERE YOU ARE LIVING?: NO

## 2025-07-23 SDOH — SOCIAL STABILITY: SOCIAL INSECURITY: ABUSE: ADULT

## 2025-07-23 SDOH — ECONOMIC STABILITY: HOUSING INSECURITY: IN THE PAST 12 MONTHS, HOW MANY TIMES HAVE YOU MOVED WHERE YOU WERE LIVING?: 0

## 2025-07-23 SDOH — SOCIAL STABILITY: SOCIAL INSECURITY: ARE THERE ANY APPARENT SIGNS OF INJURIES/BEHAVIORS THAT COULD BE RELATED TO ABUSE/NEGLECT?: NO

## 2025-07-23 SDOH — ECONOMIC STABILITY: TRANSPORTATION INSECURITY: IN THE PAST 12 MONTHS, HAS LACK OF TRANSPORTATION KEPT YOU FROM MEDICAL APPOINTMENTS OR FROM GETTING MEDICATIONS?: NO

## 2025-07-23 ASSESSMENT — PAIN SCALES - GENERAL
PAINLEVEL_OUTOF10: 0 - NO PAIN
PAIN_LEVEL: 2
PAINLEVEL_OUTOF10: 0 - NO PAIN
PAINLEVEL_OUTOF10: 5 - MODERATE PAIN
PAINLEVEL_OUTOF10: 1
PAINLEVEL_OUTOF10: 7
PAINLEVEL_OUTOF10: 0 - NO PAIN
PAINLEVEL_OUTOF10: 2
PAINLEVEL_OUTOF10: 0 - NO PAIN
PAINLEVEL_OUTOF10: 3
PAINLEVEL_OUTOF10: 5 - MODERATE PAIN
PAINLEVEL_OUTOF10: 2
PAINLEVEL_OUTOF10: 0 - NO PAIN
PAINLEVEL_OUTOF10: 7
PAINLEVEL_OUTOF10: 7
PAINLEVEL_OUTOF10: 0 - NO PAIN

## 2025-07-23 ASSESSMENT — PAIN - FUNCTIONAL ASSESSMENT
PAIN_FUNCTIONAL_ASSESSMENT: 0-10
PAIN_FUNCTIONAL_ASSESSMENT: VAS (VISUAL ANALOG SCALE)
PAIN_FUNCTIONAL_ASSESSMENT: 0-10

## 2025-07-23 ASSESSMENT — PAIN DESCRIPTION - DESCRIPTORS
DESCRIPTORS: ACHING
DESCRIPTORS: DISCOMFORT
DESCRIPTORS: ACHING
DESCRIPTORS: ACHING
DESCRIPTORS: ACHING;TENDER
DESCRIPTORS: ACHING

## 2025-07-23 ASSESSMENT — COGNITIVE AND FUNCTIONAL STATUS - GENERAL
MOVING TO AND FROM BED TO CHAIR: A LITTLE
STANDING UP FROM CHAIR USING ARMS: A LITTLE
CLIMB 3 TO 5 STEPS WITH RAILING: A LITTLE
PATIENT BASELINE BEDBOUND: NO
DRESSING REGULAR UPPER BODY CLOTHING: A LITTLE
STANDING UP FROM CHAIR USING ARMS: A LITTLE
TURNING FROM BACK TO SIDE WHILE IN FLAT BAD: A LITTLE
MOVING FROM LYING ON BACK TO SITTING ON SIDE OF FLAT BED WITH BEDRAILS: A LITTLE
HELP NEEDED FOR BATHING: A LITTLE
TOILETING: A LITTLE
MOVING TO AND FROM BED TO CHAIR: A LITTLE
MOBILITY SCORE: 18
HELP NEEDED FOR BATHING: A LOT
WALKING IN HOSPITAL ROOM: A LITTLE
MOVING FROM LYING ON BACK TO SITTING ON SIDE OF FLAT BED WITH BEDRAILS: A LITTLE
CLIMB 3 TO 5 STEPS WITH RAILING: A LITTLE
DRESSING REGULAR LOWER BODY CLOTHING: A LITTLE
TOILETING: A LITTLE
DRESSING REGULAR UPPER BODY CLOTHING: A LITTLE
WALKING IN HOSPITAL ROOM: A LITTLE
DAILY ACTIVITIY SCORE: 20
MOBILITY SCORE: 18
DAILY ACTIVITIY SCORE: 18
TURNING FROM BACK TO SIDE WHILE IN FLAT BAD: A LITTLE
DRESSING REGULAR LOWER BODY CLOTHING: A LOT

## 2025-07-23 ASSESSMENT — ACTIVITIES OF DAILY LIVING (ADL)
TOILETING: INDEPENDENT
HEARING - LEFT EAR: FUNCTIONAL
LACK_OF_TRANSPORTATION: NO
LACK_OF_TRANSPORTATION: NO
BATHING: INDEPENDENT
HEARING - RIGHT EAR: FUNCTIONAL
FEEDING YOURSELF: INDEPENDENT
WALKS IN HOME: INDEPENDENT
BATHING_ASSISTANCE: MODERATE
JUDGMENT_ADEQUATE_SAFELY_COMPLETE_DAILY_ACTIVITIES: YES
DRESSING YOURSELF: INDEPENDENT
GROOMING: INDEPENDENT
ADEQUATE_TO_COMPLETE_ADL: YES
PATIENT'S MEMORY ADEQUATE TO SAFELY COMPLETE DAILY ACTIVITIES?: YES

## 2025-07-23 ASSESSMENT — PAIN DESCRIPTION - ORIENTATION
ORIENTATION: LEFT

## 2025-07-23 ASSESSMENT — PAIN DESCRIPTION - LOCATION
LOCATION: KNEE

## 2025-07-23 ASSESSMENT — COLUMBIA-SUICIDE SEVERITY RATING SCALE - C-SSRS
6. HAVE YOU EVER DONE ANYTHING, STARTED TO DO ANYTHING, OR PREPARED TO DO ANYTHING TO END YOUR LIFE?: NO
2. HAVE YOU ACTUALLY HAD ANY THOUGHTS OF KILLING YOURSELF?: NO
1. IN THE PAST MONTH, HAVE YOU WISHED YOU WERE DEAD OR WISHED YOU COULD GO TO SLEEP AND NOT WAKE UP?: NO

## 2025-07-23 ASSESSMENT — PATIENT HEALTH QUESTIONNAIRE - PHQ9
1. LITTLE INTEREST OR PLEASURE IN DOING THINGS: NOT AT ALL
SUM OF ALL RESPONSES TO PHQ9 QUESTIONS 1 & 2: 0
2. FEELING DOWN, DEPRESSED OR HOPELESS: NOT AT ALL

## 2025-07-23 ASSESSMENT — LIFESTYLE VARIABLES
AUDIT-C TOTAL SCORE: 0
HOW OFTEN DO YOU HAVE 6 OR MORE DRINKS ON ONE OCCASION: NEVER
HOW OFTEN DO YOU HAVE A DRINK CONTAINING ALCOHOL: NEVER
AUDIT-C TOTAL SCORE: 0
SKIP TO QUESTIONS 9-10: 1
HOW MANY STANDARD DRINKS CONTAINING ALCOHOL DO YOU HAVE ON A TYPICAL DAY: PATIENT DOES NOT DRINK

## 2025-07-23 NOTE — ANESTHESIA PREPROCEDURE EVALUATION
Patient: Maddie Briseno    Procedure Information       Date/Time: 07/23/25 0730    Procedure: LEFT TOTAL KNEE REPLACEMENT (Left: Knee)    Location: [facility] / [facility]    Surgeons: Reed Francisco MD            Relevant Problems   Anesthesia (within normal limits)      Cardiac   (+) HTN (hypertension)   (+) Hypertension   (+) Mixed hyperlipidemia   (+) PFO (patent foramen ovale) (HHS-HCC)   (+) Patent foramen ovale (HHS-HCC)      Neuro   (+) Transient ischemic attack      Musculoskeletal   (+) Osteoarthritis       Clinical information reviewed:   Tobacco  Allergies  Meds  Problems  Med Hx  Surg Hx  OB Status    Fam Hx          NPO Detail:  NPO/Void Status  Carbohydrate Drink Given Prior to Surgery? : N  Date of Last Liquid: 07/23/25  Time of Last Liquid: 0600  Date of Last Solid: 07/22/25  Time of Last Solid: 1900  Last Intake Type: Clear fluids  Time of Last Void: 0623         Physical Exam    Airway  Mallampati: II  TM distance: >3 FB  Neck ROM: full  Mouth opening: 3 or more finger widths     Cardiovascular - normal exam   Dental - normal exam     Pulmonary - normal exam   Abdominal - normal exam           Anesthesia Plan    History of general anesthesia?: yes  History of complications of general anesthesia?: no    ASA 2     general, regional, spinal and MAC     The patient is not a current smoker.    intravenous induction   Postoperative pain plan includes opioids.  Trial extubation is planned.  Anesthetic plan and risks discussed with patient.  Use of blood products discussed with patient who consented to blood products.    Plan discussed with attending.

## 2025-07-23 NOTE — OP NOTE
LEFT TOTAL KNEE REPLACEMENT (L) Operative Note     Date: 2025  OR Location: [facility]    Name: Maddie Briseno, : 1950, Age: 75 y.o., MRN: [id number], Sex: female    Diagnosis  Pre-op Diagnosis      * Arthritis of left knee [M17.12] Post-op Diagnosis     * Arthritis of left knee [M17.12]     Procedures  LEFT TOTAL KNEE REPLACEMENT  83098 - LA ARTHRP KNE CONDYLE&PLATU MEDIAL&LAT COMPARTMENTS  DePuy attune femur 5, tibia 4, 5 mm polyethylene, 32 mm patella    Surgeons      * Reed Francisco - Primary    Resident/Fellow/Other Assistant:    Sudha Balderrama.  Maddy Chacon    Staff:   Circulator: Suzanne Markub Person: Crystal  Surgical Assistant: Sudha  Surgical Assistant: Maddy    Anesthesia Staff: Anesthesiologist: Estella Mason MD    Procedure Summary  Anesthesia: Regional, General, Monitor Anesthesia Care, Spinal  ASA: II  Estimated Blood Loss: 25 mL    Indications:  Patient has undergone extensive conservative treatment, but has peristant severe sharp shooting pain and difficulty with standing and walking.  Radiographs demonstrate severe degenerative changes with loss of joint space and osteophyte formation and subchondral sclerosis and cystic change.  Treatment options including no treatment were reviewed and the decision was made to proceed with surgery.    Description of procedure: The patient was brought into the operating room.  Anesthesia was performed.  The patient was positioned and prepped and draped in the usual fashion.  After Esmarch exsanguination the tourniquet was inflated.  A longitudinal midline incision was made.  Medial parapatellar arthrotomy was performed.  Medial periosteal release was done.  There was advanced degenerative disease involving the knee.  Remaining medial and lateral menisci were removed.  ACL was removed.  Notch osteophytes were removed.  Entry into the femoral canal was done and the intramedullary guide was placed.  The distal femoral cut was made.  The femur  was sized.  The cutting block was applied and then the cuts were made.  Peripheral osteophytes were removed.  Attention was turned to the tibia and the external alignment guide was used and the tibial cut was made.  Posterior osteophytes were removed.  Trialing was then done and the implants were selected.  The decision was made to resurface the patella and with an oscillating saw the patellar cut was made to remove a similar amount of bone as the patellar implant.  The patellar lug drills were drilled and the patella was trialed and tracked well.  The femoral lug drills were drilled and tibia was prepared with the appropriate punches.  The bone cuts were pulse irrigated and then well dried.  The tibial implant was cemented.  The polyethylene was impacted on and then the femoral implant was cemented followed by the patellar implant.  Extruding cement was removed and the cement was allowed to harden.  The wound was copiously irrigated with antibiotic irrigation.  Hemostasis was carefully obtained.  The knee was stable throughout a full range of motion and the patella tracked well.  The arthrotomy was closed with #1 Vicryl and flexion against gravity was 120 degrees following closure.  The remainder of the wound was closed in layers and a sterile dressing was applied.  The patient tolerated the procedure well and was taken to the recovery room in stable condition.  Estimated blood loss was 25 cc.  The bone was sent for specimen.  There were no complications.  The patient received the appropriate preoperative antibiotic within 1 hour of the skin incision and antibiotics were ordered postoperatively to be completed within 24 hours.  DVT prophylaxis was ordered to start within 24 hours.  The patient received 1 g of tranexamic acid intravenous at the start and at the end of the procedure.    Intra-op Medications:   Administrations occurring from 0730 to 1030 on 07/23/25:   Medication Name Total Dose   sodium chloride 0.9  % irrigation solution 3,800 mL   dexAMETHasone (Decadron) 4 mg/mL IV Syringe 2 mL 4 mg   fentaNYL (Sublimaze) injection 50 mcg/mL 200 mcg   HYDROmorphone (Dilaudid) injection 1 mg/mL 0.5 mg   lidocaine (cardiac) injection 2% prefilled syringe 60 mg   ondansetron (Zofran) 2 mg/mL injection 4 mg   phenylephrine 100 mcg/mL syringe 10 mL (prefilled) 200 mcg   propofol (Diprivan) injection 10 mg/mL 200 mg   rocuronium (ZeMuron) 50 mg/5 mL injection 70 mg   succinylcholine chloride injection syringe 200 mg/10 ml 200 mg   tranexamic acid 1,000 mg in sodium chloride (iso)  mL 2,000 mg   ceFAZolin (Ancef) 2 g in dextrose (iso) IV 50 mL 2 g              Anesthesia Record               Intraprocedure I/O Totals          Intake    Tranexamic Acid 0.00 mL    The total shown is the total volume documented since Anesthesia Start was filed.    ceFAZolin (Ancef) 2 g in dextrose (iso) IV 50 mL 50.00 mL    Total Intake 50 mL       Output    NG/OG Tube Output 50 mL    Total Output 50 mL       Net    Net Volume 0 mL          Specimen:   ID Type Source Tests Collected by Time   1 : LEFT KNEE BONE AND TISSUE Tissue KNEE ARTHROPLASTY LEFT SURGICAL PATHOLOGY EXAM Reed Francisco MD 7/23/2025 0803            Tourniquet Times:     Total Tourniquet Time Documented:  Thigh (Left) - 53 minutes  Total: Thigh (Left) - 53 minutes      Implants:  Implants       Type Name Action Serial No.      Joint Knee CEMENT, BONE, BIOMET R, 1X40 - [id number] Implanted      Joint Knee CEMENT, BONE, BIOMET R, 1X40 - [id number] Implanted      Joint Knee INSERT, ATTUNE CR FB, SZ 5, 5MM - [id number] Implanted      Joint Knee TIBAL BASE ATTUNE FB, SZ 4 ALEJANDRA - [id number] Implanted      Joint Knee DOME, PATELLA, MEDIALIZED, 32MM - [id number] Implanted      Joint Knee FEMORAL, ATTUNE CR, ALEJANDRA, SZ 5, LT - [id number] Implanted               Task Performed by RNFA or Surgical Assistant:  The assistant was required due to the complexity of the procedure to provide  retraction for adequate and safe exposure, to assist with securing cutting guides as they were held in place by operating surgeon, to assist in patient leg positioning throughout the procedure, assist with trial implant placement and trial reduction and range of motion testing, assist with final implant placement and assist with wound closure.  She was involved in the performance of the entire procedure.  No qualified resident was available.            Attending Attestation: I performed the procedure.    Reed Francisco  Phone Number: [phone]

## 2025-07-23 NOTE — CARE PLAN
The patient's goals for the shift include Pain control    The clinical goals for the shift include   Problem: Pain - Adult  Goal: Verbalizes/displays adequate comfort level or baseline comfort level  Outcome: Progressing     Problem: Safety - Adult  Goal: Free from fall injury  Outcome: Progressing     Problem: Discharge Planning  Goal: Discharge to home or other facility with appropriate resources  Outcome: Progressing     Problem: Chronic Conditions and Co-morbidities  Goal: Patient's chronic conditions and co-morbidity symptoms are monitored and maintained or improved  Outcome: Progressing     Problem: Nutrition  Goal: Nutrient intake appropriate for maintaining nutritional needs  Outcome: Progressing     Problem: Skin  Goal: Decreased wound size/increased tissue granulation at next dressing change  Outcome: Progressing  Flowsheets (Taken 7/23/2025 1126)  Decreased wound size/increased tissue granulation at next dressing change: Promote sleep for wound healing  Goal: Participates in plan/prevention/treatment measures  Outcome: Progressing  Flowsheets (Taken 7/23/2025 1126)  Participates in plan/prevention/treatment measures: Discuss with provider PT/OT consult  Goal: Prevent/manage excess moisture  Outcome: Progressing  Flowsheets (Taken 7/23/2025 1126)  Prevent/manage excess moisture: Moisturize dry skin  Goal: Prevent/minimize sheer/friction injuries  Outcome: Progressing  Flowsheets (Taken 7/23/2025 1126)  Prevent/minimize sheer/friction injuries: Use pull sheet  Goal: Promote/optimize nutrition  Outcome: Progressing  Flowsheets (Taken 7/23/2025 1126)  Promote/optimize nutrition:   Consume > 50% meals/supplements   Monitor/record intake including meals  Goal: Promote skin healing  Outcome: Progressing  Flowsheets (Taken 7/23/2025 1126)  Promote skin healing: Assess skin/pad under line(s)/device(s)

## 2025-07-23 NOTE — PROGRESS NOTES
Physical Therapy    Physical Therapy Evaluation    Patient Name: Maddie Briseno  MRN: [id number]  Today's Date: 7/23/2025   Time Calculation  Start Time: 1443  Stop Time: 1458  Time Calculation (min): 15 min  210/210-A    Assessment/Plan   PT Assessment  PT Assessment Results: Decreased strength, Decreased range of motion, Decreased endurance, Impaired balance, Decreased mobility  Rehab Prognosis: Good  Barriers to Discharge Home: No anticipated barriers  End of Session Communication: Bedside nurse  Assessment Comment: Pt demonstrates impaired mobility throughout the session requring increased level of assistance. Pt not at baseline and will benefit from further acute PT services and therapy s/p discharge to regain function and independence.  End of Session Patient Position: Bed, 2 rail up, Alarm on (all needs in reach and no complaints noted)  IP OR SWING BED PT PLAN  Inpatient or Swing Bed: Inpatient  PT Plan  Treatment/Interventions: Bed mobility, Transfer training, Gait training, Stair training, Strengthening  PT Plan: Ongoing PT  PT Frequency: BID  PT Discharge Recommendations: Low intensity level of continued care  PT Recommended Transfer Status: Assist x1  PT - OK to Discharge: Yes    Subjective     Current Problem:  Problem List[1]    General Visit Information:  General  Reason for Referral: PT Eval and Treat  Referred By: Nolan  Past Medical History Relevant to Rehab: 75 yr old female POD#0 L TKA.  Family/Caregiver Present:  ( and daughter present)  Co-Treatment: OT  Co-Treatment Reason: maximize pt safety and function  Prior to Session Communication: Bedside nurse  Patient Position Received: Bed, 2 rail up, Alarm on  General Comment: Agreeable to PT    Home Living:  Home Living  Type of Home: House  Lives With: Spouse  Home Adaptive Equipment: Walker rolling or standard, Cane  Home Layout: One level  Home Access:  (2 LUNA no HR)  Bathroom Shower/Tub: Walk-in shower  Bathroom Toilet: Handicapped  height  Bathroom Equipment: Bedside commode (2 BSC, one for shower and one for toilet)  Home Living Comments: std. bed or plans to sleep in recliner    Prior Level of Function:  Prior Function Per Pt/Caregiver Report  Level of [place]: Independent with ADLs and functional transfers, Independent with homemaking with ambulation (Pt amb with SPC, shares IADLs, (+) drive)    Precautions:  Precautions  LE Weight Bearing Status: Weight Bearing as Tolerated (L LE)  Medical Precautions: Fall precautions    Vital Signs:  Vital Signs  Vital Signs Comment: VSS    Objective     Pain:  Pain Assessment  Pain Assessment:  (0/10, premedicated)    Cognition:  Cognition  Overall Cognitive Status: Within Functional Limits    General Assessments:  Sensation  Light Touch: No apparent deficits  Strength  Strength Comments: R LE ROM and strength WFL, L knee ROM 0-90* and strength at least 3+/5     Static Sitting Balance  Static Sitting-Level of Assistance: Close supervision  Static Standing Balance  Static Standing-Level of Assistance: Contact guard  Dynamic Standing Balance  Dynamic Standing-Level of Assistance: Minimum assistance, Contact guard    Functional Assessments:  Bed Mobility  Bed Mobility: Yes  Bed Mobility 1  Bed Mobility Comments 1: supine <> sitting: SBA  Transfers  Transfer: Yes  Transfer 1  Trials/Comments 1: STS from EOB: CGA with cueing for technique  Ambulation/Gait Training  Ambulation/Gait Training Performed: Yes  Ambulation/Gait Training 1  Comments/Distance (ft) 1: Pt was able to amb 15' using RW with CGA to min A x 1 for RW management.    Outcome Measures:  [facility] Basic Mobility  Turning from your back to your side while in a flat bed without using bedrails: A little  Moving from lying on your back to sitting on the side of a flat bed without using bedrails: A little  Moving to and from bed to chair (including a wheelchair): A little  Standing up from a chair using your arms (e.g. wheelchair or bedside chair):  A little  To walk in hospital room: A little  Climbing 3-5 steps with railing: A little  Basic Mobility - Total Score: 18     Goals:  Encounter Problems       Encounter Problems (Active)       PT Problem       STG - Pt will transition supine <> sitting with SUP  (Progressing)       Start:  07/23/25    Expected End:  08/06/25            STG - Pt will transfer STS with SUP  (Progressing)       Start:  07/23/25    Expected End:  08/06/25            STG - Pt will amb 50' using RW with SUP  (Progressing)       Start:  07/23/25    Expected End:  08/06/25            STG -  Pt will navigate 4 stairs using HR with SBA  (Progressing)       Start:  07/23/25    Expected End:  08/06/25            STG - Pt will perform a B LE ther ex program of 2-3 sets of 10  (Progressing)       Start:  07/23/25    Expected End:  08/06/25                 Education Documentation  Precautions, taught by Moni Reece PT at 7/23/2025  3:21 PM.  Learner: Patient  Readiness: Acceptance  Method: Explanation  Response: Verbalizes Understanding  Comment: PT POC    Mobility Training, taught by Moni Reece PT at 7/23/2025  3:21 PM.  Learner: Patient  Readiness: Acceptance  Method: Explanation  Response: Verbalizes Understanding  Comment: PT POC    Education Comments  No comments found.           [1]   Patient Active Problem List  Diagnosis    Anal fissure    Dense breast tissue    Hemorrhoids    Osteoarthritis    Insomnia    HTN (hypertension)    PFO (patent foramen ovale) (HHS-HCC)    Transient ischemic attack    Vaginal atrophy    Vitamin D deficiency    Urinary frequency    Intermittent lightheadedness    Heart palpitations    Acute cough    Arthritis of knee    Difficulty sleeping    Disorder of upper respiratory system    Dizzy spells    History of hypercholesterolemia    Knee pain    Lipoma    Localized edema    Mixed hyperlipidemia    Spasm    Sprain of shoulder    Palpitations    Hypertension    Patent foramen ovale (HHS-HCC)    Increased  frequency of urination    Hyperglycemia    Chronic kidney disease, stage 3a (Multi)    Arthritis of left knee    Encounter for pre-operative cardiovascular clearance

## 2025-07-23 NOTE — ANESTHESIA POSTPROCEDURE EVALUATION
Patient: Maddie Briseno    Procedure Summary       Date: 07/23/25 Room / Location: [facility] 07 / [facility]    Anesthesia Start: 0730 Anesthesia Stop: 0950    Procedure: LEFT TOTAL KNEE REPLACEMENT (Left: Knee) Diagnosis:       Arthritis of left knee      (Arthritis of left knee [M17.12])    Surgeons: Reed Francisco MD Responsible Provider: Estella Mason MD    Anesthesia Type: general, regional, spinal, MAC ASA Status: 2            Anesthesia Type: general, regional, spinal, MAC    Vitals Value Taken Time   /77 07/23/25 09:50   Temp 36.3 07/23/25 09:50   Pulse 81 07/23/25 09:48   Resp 16 07/23/25 09:50   SpO2 100 % 07/23/25 09:48       Anesthesia Post Evaluation    Patient location during evaluation: PACU  Patient participation: complete - patient participated  Level of consciousness: awake and alert  Pain score: 2  Pain management: adequate  Airway patency: patent  Cardiovascular status: acceptable and hemodynamically stable  Respiratory status: acceptable, spontaneous ventilation and nasal cannula  Hydration status: acceptable  Postoperative Nausea and Vomiting: none        There were no known notable events for this encounter.

## 2025-07-23 NOTE — ANESTHESIA PROCEDURE NOTES
"Peripheral Block    Patient location during procedure: pre-op  Medication administered at: 7/23/2025 7:17 AM  End time: 7/23/2025 7:24 AM  Reason for block: at surgeon's request and post-op pain management  Staffing  Performed: attending   Authorized by: Estella Mason MD    Performed by: Estella Mason MD  Preanesthetic Checklist  Completed: patient identified, IV checked, site marked, risks and benefits discussed, surgical consent, monitors and equipment checked, pre-op evaluation and timeout performed   Timeout performed at: 7/23/2025 7:17 AM  Peripheral Block  Patient position: laying flat  Prep: ChloraPrep  Patient monitoring: heart rate, continuous pulse ox and cardiac monitor  Block type: adductor canal  Laterality: left  Injection technique: single-shot  Guidance: ultrasound guided  Needle  Needle gauge: 21 G  Needle length: 10 cm  Needle localization: ultrasound guidance  Needle insertion depth: 5 cm  Assessment  Injection assessment: negative aspiration for heme, no paresthesia on injection, incremental injection and local visualized surrounding nerve on ultrasound  Paresthesia pain: none  Heart rate change: no  Slow fractionated injection: yes  Additional Notes  Ropivacaine 0.5% 25 ml\"s with Epinephrine 1:200,000 and Methylprednisolone 40 mg given incrementally in 3 ml's volume with negative aspirations. Patient awake throughout. The patient tolerated the procedure well.           "

## 2025-07-23 NOTE — ANESTHESIA PROCEDURE NOTES
Airway  Date/Time: 7/23/2025 7:38 AM  Reason: elective    Airway not difficult    Staffing  Performed: attending   Authorized by: Estella Mason MD    Performed by: Estella Mason MD  Patient location during procedure: OR    Patient Condition  Indications for airway management: anesthesia  Patient position: sniffing  MILS maintained throughout  Planned trial extubation  Sedation level: deep     Final Airway Details   Preoxygenated: yes  Final airway type: endotracheal airway  Successful airway: ETT  Cuffed: yes   Successful intubation technique: video laryngoscopy  Adjuncts used in placement: intubating stylet and cricoid pressure  Endotracheal tube insertion site: oral  Blade: Anurag  Blade size: #4  ETT size (mm): 7.0  Cormack-Lehane Classification: grade IIa - partial view of glottis  Placement verified by: chest auscultation and capnometry   Cuff volume (mL): 10  Measured from: teeth  ETT to teeth (cm): 21  Ventilation between attempts: none  Number of attempts at approach: 1  Number of other approaches attempted: 0    Additional Comments  RSI with cricoid pressure

## 2025-07-23 NOTE — CONSULTS
Inpatient consult to Medicine  Consult performed by: Patt Vizcaino DO  Consult ordered by: Natalie Bray, GISELLA-CNP          Reason For Consult  Medical management    History Of Present Illness  Maddie Briseno is a 75 y.o. female with past medical history of hypertension, hyperlipidemia, CKD stage III, PFO, and OA who underwent left total knee replacement on 2025 with orthopedic surgery.  Medicine team was consulted for medical management.    Patient was seen and examined at bedside. She reports feeling well. She complained of some dizziness when standing from seated position to go to the bathroom. She admits to some pain however it is well controlled with PRN pain medications. She denies any issues with urination. She has not passed gas or had a bowel movement yet. Appetite is not quite back to normal but she tolerated broth today.      Past Medical History  She has a past medical history of Disease of upper respiratory tract, unspecified, Personal history of other diseases of the musculoskeletal system and connective tissue, and Personal history of other endocrine, nutritional and metabolic disease.    Surgical History  She has a past surgical history that includes Other surgical history (2014);  section, classic (2014); Colonoscopy (2015); Back surgery (2015); Other surgical history (10/13/2021); CT angio head w and wo IV contrast (2021); and CT angio neck (2021).     Social History  She reports that she has never smoked. She has never been exposed to tobacco smoke. She has never used smokeless tobacco. She reports current alcohol use. She reports that she does not use drugs.    Family History  Family History[1]     Allergies  Cephalexin    Review of Systems  ROS performed and negative except for those listed in the HPI.     Physical Exam  General: No acute distress  HEENT: EOMI. Moist mucous membranes.  CV: Regular rate and rhythm, normal S1 and S2, no  murmurs  Pulm: Clear to auscultation bilaterally, no wheezings, rales or rhonchi  Abd: Nondistended, nontender  Ext: Left leg completely wrapped in ace bandage. SCDs in place bilaterally.   Skin: No rashes, warm and dry  Neuro: no focal deficit      Last Recorded Vitals  /55 (BP Location: Left arm, Patient Position: Sitting)   Pulse 78   Temp 35.8 °C (96.4 °F) (Temporal)   Resp 16   Wt 74 kg (163 lb 2.3 oz)   SpO2 93%        Assessment/Plan   Maddie Briseno is a 75 y.o. female with past medical history of hypertension, hyperlipidemia, CKD stage III, PFO, and OA who underwent left total knee replacement on 7/23/2025 with orthopedic surgery.     Acute medical conditions:     #S/p left total knee replacement   PLAN:  -Pain regimen: scheduled tylenol, PRN flexeril, dilaudid, roxicodone, tramadol per surgery recommendations  -continue cefazolin as surgical ppx  -continue bowel regimen with miralax and colace  -continue xarelto as postop DVT ppx  -General surgery as primary     Chronic medical conditions:   #HLD-continue home simvastatin   #HTN- no home medications   #CKD stage 3  #PFO  #OA-holding home ibuprofen    Fluids:  ml/hr  DVT ppx: Xarelto  Diet: regular   CODE status: Full code    Patt Vizcaino DO PGY-2 Internal Medicine  This is a preliminary note, please await attending attestation for final A/P.                [1] No family history on file.

## 2025-07-23 NOTE — ADDENDUM NOTE
Addendum  created 07/23/25 1032 by Estella Mason MD    Intraprocedure Staff edited       Medical Necessity Information: It is in your best interest to select a reason for this procedure from the list below. All of these items fulfill various CMS LCD requirements except lesion extends to a margin. Include Z78.9 (Other Specified Conditions Influencing Health Status) As An Associated Diagnosis?: No Lab: [id number] Referring Physician (Optional): Luis Cardoza Surgeon (Optional): Lei Vergara Size Of Lesion In Cm: 0.6 X Size Of Lesion In Cm (Optional): 0.9 Size Of Margin In Cm: 0.1 Anesthesia Volume In Cc: 5 Eye Clamp Note Details: An eye clamp was used during the procedure. Excision Method: Fusiform Saucerization Depth: dermis and superficial adipose tissue Did You Provide Opioid Counseling: Yes Repair Type: Flap Intermediate / Complex Repair - Final Wound Length In Cm: 0 Suturegard Retention Suture: 2-0 Nylon Retention Suture Bite Size: 3 mm Length To Time In Minutes Device Was In Place: 10 Number Of Hemigard Strips Per Side: 1 Undermining Type: Entire Wound Debridement Text: The wound edges were debrided prior to proceeding with the closure to facilitate wound healing. Helical Rim Text: The closure involved the helical rim. Vermilion Border Text: The closure involved the vermilion border. Nostril Rim Text: The closure involved the nostril rim. Retention Suture Text: Retention sutures were placed to support the closure and prevent dehiscence. Flap Type: Z-plasty Primary Defect Length (In Cm): 3.1 Primary Defect Width (In Cm): 1.1 Secondary Defect Length (In Cm): 1.5 Secondary Defect Width (In Cm): 1.6 Epidermal Closure Graft Donor Site (Optional): simple interrupted Suture Removal: 6 days Detail Level: Detailed Excision Depth: adipose tissue Scalpel Size: 11 blade Anesthesia Type: 1% lidocaine with epinephrine Additional Anesthesia Volume In Cc: 6 Hemostasis: Electrocautery Estimated Blood Loss (Cc): minimal Deep Sutures: 5-0 Vicryl Epidermal Sutures: 4-0 Ethilon Wound Care: Petrolatum Dressing: dry sterile dressing Suturegard Intro: Intraoperative tissue expansion was performed, utilizing the SUTUREGARD device, in order to reduce wound tension. Suturegard Body: The suture ends were repeatedly re-tightened and re-clamped to achieve the desired tissue expansion. Hemigard Intro: Due to skin fragility and wound tension, it was decided to use HEMIGARD adhesive retention suture devices to permit a linear closure. The skin was cleaned and dried for a 6cm distance away from the wound. Excessive hair, if present, was removed to allow for adhesion. Hemigard Postcare Instructions: The HEMIGARD strips are to remain completely dry for at least 5-7 days. Graft Donor Site Bandage (Optional-Leave Blank If You Don't Want In Note): Steri-strips and a pressure bandage were applied to the donor site. Positioning (Leave Blank If You Do Not Want): The patient was placed in a comfortable position exposing the surgical site. Complex Repair Preamble Text (Leave Blank If You Do Not Want): Extensive wide undermining was performed. Intermediate Repair Preamble Text (Leave Blank If You Do Not Want): Undermining was performed with blunt dissection. Fusiform Excision Additional Text (Leave Blank If You Do Not Want): The margin was drawn around the clinically apparent lesion.  A fusiform shape was then drawn on the skin incorporating the lesion and margins.  Incisions were then made along these lines to the appropriate tissue plane and the lesion was extirpated. Eliptical Excision Additional Text (Leave Blank If You Do Not Want): The margin was drawn around the clinically apparent lesion.  An elliptical shape was then drawn on the skin incorporating the lesion and margins.  Incisions were then made along these lines to the appropriate tissue plane and the lesion was extirpated. Saucerization Excision Additional Text (Leave Blank If You Do Not Want): The margin was drawn around the clinically apparent lesion.  Incisions were then made along these lines, in a tangential fashion, to the appropriate tissue plane and the lesion was extirpated. Slit Excision Additional Text (Leave Blank If You Do Not Want): A linear line was drawn on the skin overlying the lesion. An incision was made slowly until the lesion was visualized.  Once visualized, the lesion was removed with blunt dissection. Excisional Biopsy Additional Text (Leave Blank If You Do Not Want): The margin was drawn around the clinically apparent lesion. An elliptical shape was then drawn on the skin incorporating the lesion and margins.  Incisions were then made along these lines to the appropriate tissue plane and the lesion was extirpated. Perilesional Excision Additional Text (Leave Blank If You Do Not Want): The margin was drawn around the clinically apparent lesion. Incisions were then made along these lines to the appropriate tissue plane and the lesion was extirpated. Repair Performed By Another Provider Text (Leave Blank If You Do Not Want): After the tissue was excised the defect was repaired by another provider. No Repair - Repaired With Adjacent Surgical Defect Text (Leave Blank If You Do Not Want): After the excision the defect was repaired concurrently with another surgical defect which was in close approximation. Adjacent Tissue Transfer Text: The defect edges were debeveled with a #15 scalpel blade. Given the location of the defect and the proximity to free margins an adjacent tissue transfer was deemed most appropriate. Using a sterile surgical marker, an appropriate flap was drawn incorporating the defect and placing the expected incisions within the relaxed skin tension lines where possible. The area thus outlined was incised deep to adipose tissue with a #15 scalpel blade. The skin margins were undermined to an appropriate distance in all directions utilizing iris scissors and carried over to close the primary defect. Advancement Flap (Single) Text: The defect edges were debeveled with a #15 scalpel blade.  Given the location of the defect and the proximity to free margins a single advancement flap was deemed most appropriate.  Using a sterile surgical marker, an appropriate advancement flap was drawn incorporating the defect and placing the expected incisions within the relaxed skin tension lines where possible.    The area thus outlined was incised deep to adipose tissue with a #15 scalpel blade.  The skin margins were undermined to an appropriate distance in all directions utilizing iris scissors. Advancement Flap (Double) Text: The defect edges were debeveled with a #15 scalpel blade.  Given the location of the defect and the proximity to free margins a double advancement flap was deemed most appropriate.  Using a sterile surgical marker, the appropriate advancement flaps were drawn incorporating the defect and placing the expected incisions within the relaxed skin tension lines where possible.    The area thus outlined was incised deep to adipose tissue with a #15 scalpel blade.  The skin margins were undermined to an appropriate distance in all directions utilizing iris scissors. Burow's Advancement Flap Text: The defect edges were debeveled with a #15 scalpel blade.  Given the location of the defect and the proximity to free margins a Burow's advancement flap was deemed most appropriate.  Using a sterile surgical marker, the appropriate advancement flap was drawn incorporating the defect and placing the expected incisions within the relaxed skin tension lines where possible.    The area thus outlined was incised deep to adipose tissue with a #15 scalpel blade.  The skin margins were undermined to an appropriate distance in all directions utilizing iris scissors. Chonodrocutaneous Helical Advancement Flap Text: The defect edges were debeveled with a #15 scalpel blade.  Given the location of the defect and the proximity to free margins a chondrocutaneous helical advancement flap was deemed most appropriate.  Using a sterile surgical marker, the appropriate advancement flap was drawn incorporating the defect and placing the expected incisions within the relaxed skin tension lines where possible.    The area thus outlined was incised deep to adipose tissue with a #15 scalpel blade.  The skin margins were undermined to an appropriate distance in all directions utilizing iris scissors. Crescentic Advancement Flap Text: The defect edges were debeveled with a #15 scalpel blade.  Given the location of the defect and the proximity to free margins a crescentic advancement flap was deemed most appropriate.  Using a sterile surgical marker, the appropriate advancement flap was drawn incorporating the defect and placing the expected incisions within the relaxed skin tension lines where possible.    The area thus outlined was incised deep to adipose tissue with a #15 scalpel blade.  The skin margins were undermined to an appropriate distance in all directions utilizing iris scissors. A-T Advancement Flap Text: The defect edges were debeveled with a #15 scalpel blade.  Given the location of the defect, shape of the defect and the proximity to free margins an A-T advancement flap was deemed most appropriate.  Using a sterile surgical marker, an appropriate advancement flap was drawn incorporating the defect and placing the expected incisions within the relaxed skin tension lines where possible.    The area thus outlined was incised deep to adipose tissue with a #15 scalpel blade.  The skin margins were undermined to an appropriate distance in all directions utilizing iris scissors. O-T Advancement Flap Text: The defect edges were debeveled with a #15 scalpel blade.  Given the location of the defect, shape of the defect and the proximity to free margins an O-T advancement flap was deemed most appropriate.  Using a sterile surgical marker, an appropriate advancement flap was drawn incorporating the defect and placing the expected incisions within the relaxed skin tension lines where possible.    The area thus outlined was incised deep to adipose tissue with a #15 scalpel blade.  The skin margins were undermined to an appropriate distance in all directions utilizing iris scissors. O-L Flap Text: The defect edges were debeveled with a #15 scalpel blade.  Given the location of the defect, shape of the defect and the proximity to free margins an O-L flap was deemed most appropriate.  Using a sterile surgical marker, an appropriate advancement flap was drawn incorporating the defect and placing the expected incisions within the relaxed skin tension lines where possible.    The area thus outlined was incised deep to adipose tissue with a #15 scalpel blade.  The skin margins were undermined to an appropriate distance in all directions utilizing iris scissors. O-Z Flap Text: The defect edges were debeveled with a #15 scalpel blade.  Given the location of the defect, shape of the defect and the proximity to free margins an O-Z flap was deemed most appropriate.  Using a sterile surgical marker, an appropriate transposition flap was drawn incorporating the defect and placing the expected incisions within the relaxed skin tension lines where possible. The area thus outlined was incised deep to adipose tissue with a #15 scalpel blade.  The skin margins were undermined to an appropriate distance in all directions utilizing iris scissors. Double O-Z Flap Text: The defect edges were debeveled with a #15 scalpel blade.  Given the location of the defect, shape of the defect and the proximity to free margins a Double O-Z flap was deemed most appropriate.  Using a sterile surgical marker, an appropriate transposition flap was drawn incorporating the defect and placing the expected incisions within the relaxed skin tension lines where possible. The area thus outlined was incised deep to adipose tissue with a #15 scalpel blade.  The skin margins were undermined to an appropriate distance in all directions utilizing iris scissors. V-Y Flap Text: The defect edges were debeveled with a #15 scalpel blade.  Given the location of the defect, shape of the defect and the proximity to free margins a V-Y flap was deemed most appropriate.  Using a sterile surgical marker, an appropriate advancement flap was drawn incorporating the defect and placing the expected incisions within the relaxed skin tension lines where possible.    The area thus outlined was incised deep to adipose tissue with a #15 scalpel blade.  The skin margins were undermined to an appropriate distance in all directions utilizing iris scissors. Advancement-Rotation Flap Text: The defect edges were debeveled with a #15 scalpel blade.  Given the location of the defect, shape of the defect and the proximity to free margins an advancement-rotation flap was deemed most appropriate.  Using a sterile surgical marker, an appropriate flap was drawn incorporating the defect and placing the expected incisions within the relaxed skin tension lines where possible. The area thus outlined was incised deep to adipose tissue with a #15 scalpel blade.  The skin margins were undermined to an appropriate distance in all directions utilizing iris scissors. Mercedes Flap Text: The defect edges were debeveled with a #15 scalpel blade.  Given the location of the defect, shape of the defect and the proximity to free margins a Mercedes flap was deemed most appropriate.  Using a sterile surgical marker, an appropriate advancement flap was drawn incorporating the defect and placing the expected incisions within the relaxed skin tension lines where possible. The area thus outlined was incised deep to adipose tissue with a #15 scalpel blade.  The skin margins were undermined to an appropriate distance in all directions utilizing iris scissors. Modified Advancement Flap Text: The defect edges were debeveled with a #15 scalpel blade.  Given the location of the defect, shape of the defect and the proximity to free margins a modified advancement flap was deemed most appropriate.  Using a sterile surgical marker, an appropriate advancement flap was drawn incorporating the defect and placing the expected incisions within the relaxed skin tension lines where possible.    The area thus outlined was incised deep to adipose tissue with a #15 scalpel blade.  The skin margins were undermined to an appropriate distance in all directions utilizing iris scissors. Mucosal Advancement Flap Text: Given the location of the defect, shape of the defect and the proximity to free margins a mucosal advancement flap was deemed most appropriate. Incisions were made with a 15 blade scalpel in the appropriate fashion along the cutaneous vermilion border and the mucosal lip. The remaining actinically damaged mucosal tissue was excised.  The mucosal advancement flap was then elevated to the gingival sulcus with care taken to preserve the neurovascular structures and advanced into the primary defect. Care was taken to ensure that precise realignment of the vermilion border was achieved. Peng Advancement Flap Text: The defect edges were debeveled with a #15 scalpel blade.  Given the location of the defect, shape of the defect and the proximity to free margins a Peng advancement flap was deemed most appropriate.  Using a sterile surgical marker, an appropriate advancement flap was drawn incorporating the defect and placing the expected incisions within the relaxed skin tension lines where possible. The area thus outlined was incised deep to adipose tissue with a #15 scalpel blade.  The skin margins were undermined to an appropriate distance in all directions utilizing iris scissors. Hatchet Flap Text: The defect edges were debeveled with a #15 scalpel blade.  Given the location of the defect, shape of the defect and the proximity to free margins a hatchet flap was deemed most appropriate.  Using a sterile surgical marker, an appropriate hatchet flap was drawn incorporating the defect and placing the expected incisions within the relaxed skin tension lines where possible.    The area thus outlined was incised deep to adipose tissue with a #15 scalpel blade.  The skin margins were undermined to an appropriate distance in all directions utilizing iris scissors. Rotation Flap Text: The defect edges were debeveled with a #15 scalpel blade.  Given the location of the defect, shape of the defect and the proximity to free margins a rotation flap was deemed most appropriate.  Using a sterile surgical marker, an appropriate rotation flap was drawn incorporating the defect and placing the expected incisions within the relaxed skin tension lines where possible.    The area thus outlined was incised deep to adipose tissue with a #15 scalpel blade.  The skin margins were undermined to an appropriate distance in all directions utilizing iris scissors. Spiral Flap Text: The defect edges were debeveled with a #15 scalpel blade.  Given the location of the defect, shape of the defect and the proximity to free margins a spiral flap was deemed most appropriate.  Using a sterile surgical marker, an appropriate rotation flap was drawn incorporating the defect and placing the expected incisions within the relaxed skin tension lines where possible. The area thus outlined was incised deep to adipose tissue with a #15 scalpel blade.  The skin margins were undermined to an appropriate distance in all directions utilizing iris scissors. Staged Advancement Flap Text: The defect edges were debeveled with a #15 scalpel blade.  Given the location of the defect, shape of the defect and the proximity to free margins a staged advancement flap was deemed most appropriate.  Using a sterile surgical marker, an appropriate advancement flap was drawn incorporating the defect and placing the expected incisions within the relaxed skin tension lines where possible. The area thus outlined was incised deep to adipose tissue with a #15 scalpel blade.  The skin margins were undermined to an appropriate distance in all directions utilizing iris scissors. Star Wedge Flap Text: The defect edges were debeveled with a #15 scalpel blade.  Given the location of the defect, shape of the defect and the proximity to free margins a star wedge flap was deemed most appropriate.  Using a sterile surgical marker, an appropriate rotation flap was drawn incorporating the defect and placing the expected incisions within the relaxed skin tension lines where possible. The area thus outlined was incised deep to adipose tissue with a #15 scalpel blade.  The skin margins were undermined to an appropriate distance in all directions utilizing iris scissors. Transposition Flap Text: The defect edges were debeveled with a #15 scalpel blade.  Given the location of the defect and the proximity to free margins a transposition flap was deemed most appropriate.  Using a sterile surgical marker, an appropriate transposition flap was drawn incorporating the defect.    The area thus outlined was incised deep to adipose tissue with a #15 scalpel blade.  The skin margins were undermined to an appropriate distance in all directions utilizing iris scissors. Muscle Hinge Flap Text: The defect edges were debeveled with a #15 scalpel blade.  Given the size, depth and location of the defect and the proximity to free margins a muscle hinge flap was deemed most appropriate.  Using a sterile surgical marker, an appropriate hinge flap was drawn incorporating the defect. The area thus outlined was incised with a #15 scalpel blade.  The skin margins were undermined to an appropriate distance in all directions utilizing iris scissors. Mustarde Flap Text: The defect edges were debeveled with a #15 scalpel blade.  Given the size, depth and location of the defect and the proximity to free margins a Mustarde flap was deemed most appropriate. Using a sterile surgical marker, an appropriate flap was drawn incorporating the defect. The area thus outlined was incised with a #15 scalpel blade. The skin margins were undermined to an appropriate distance in all directions utilizing iris scissors. Following this, the designed flap was carried into the primary defect and sutured into place. Nasal Turnover Hinge Flap Text: The defect edges were debeveled with a #15 scalpel blade.  Given the size, depth, location of the defect and the defect being full thickness a nasal turnover hinge flap was deemed most appropriate.  Using a sterile surgical marker, an appropriate hinge flap was drawn incorporating the defect. The area thus outlined was incised with a #15 scalpel blade. The flap was designed to recreate the nasal mucosal lining and the alar rim. The skin margins were undermined to an appropriate distance in all directions utilizing iris scissors. Nasalis-Muscle-Based Myocutaneous Island Pedicle Flap Text: Using a #15 blade, an incision was made around the donor flap to the level of the nasalis muscle. Wide lateral undermining was then performed in both the subcutaneous plane above the nasalis muscle, and in a submuscular plane just above periosteum. This allowed the formation of a free nasalis muscle axial pedicle (based on the angular artery) which was still attached to the actual cutaneous flap, increasing its mobility and vascular viability. Hemostasis was obtained with pinpoint electrocoagulation. The flap was mobilized into position and the pivotal anchor points positioned and stabilized with buried interrupted sutures. Subcutaneous and dermal tissues were closed in a multilayered fashion with sutures. Tissue redundancies were excised, and the epidermal edges were apposed without significant tension and sutured with sutures. Orbicularis Oris Muscle Flap Text: The defect edges were debeveled with a #15 scalpel blade.  Given that the defect affected the competency of the oral sphincter an orbicularis oris muscle flap was deemed most appropriate to restore this competency and normal muscle function.  Using a sterile surgical marker, an appropriate flap was drawn incorporating the defect. The area thus outlined was incised with a #15 scalpel blade. Melolabial Transposition Flap Text: The defect edges were debeveled with a #15 scalpel blade.  Given the location of the defect and the proximity to free margins a melolabial flap was deemed most appropriate.  Using a sterile surgical marker, an appropriate melolabial transposition flap was drawn incorporating the defect.    The area thus outlined was incised deep to adipose tissue with a #15 scalpel blade.  The skin margins were undermined to an appropriate distance in all directions utilizing iris scissors. Rhombic Flap Text: The defect edges were debeveled with a #15 scalpel blade.  Given the location of the defect and the proximity to free margins a rhombic flap was deemed most appropriate.  Using a sterile surgical marker, an appropriate rhombic flap was drawn incorporating the defect.    The area thus outlined was incised deep to adipose tissue with a #15 scalpel blade.  The skin margins were undermined to an appropriate distance in all directions utilizing iris scissors. Rhomboid Transposition Flap Text: The defect edges were debeveled with a #15 scalpel blade.  Given the location of the defect and the proximity to free margins a rhomboid transposition flap was deemed most appropriate.  Using a sterile surgical marker, an appropriate rhomboid flap was drawn incorporating the defect.    The area thus outlined was incised deep to adipose tissue with a #15 scalpel blade.  The skin margins were undermined to an appropriate distance in all directions utilizing iris scissors. Bi-Rhombic Flap Text: The defect edges were debeveled with a #15 scalpel blade.  Given the location of the defect and the proximity to free margins a bi-rhombic flap was deemed most appropriate.  Using a sterile surgical marker, an appropriate rhombic flap was drawn incorporating the defect. The area thus outlined was incised deep to adipose tissue with a #15 scalpel blade.  The skin margins were undermined to an appropriate distance in all directions utilizing iris scissors. Helical Rim Advancement Flap Text: The defect edges were debeveled with a #15 blade scalpel.  Given the location of the defect and the proximity to free margins (helical rim) a double helical rim advancement flap was deemed most appropriate.  Using a sterile surgical marker, the appropriate advancement flaps were drawn incorporating the defect and placing the expected incisions between the helical rim and antihelix where possible.  The area thus outlined was incised through and through with a #15 scalpel blade.  With a skin hook and iris scissors, the flaps were gently and sharply undermined and freed up. Bilateral Helical Rim Advancement Flap Text: The defect edges were debeveled with a #15 blade scalpel.  Given the location of the defect and the proximity to free margins (helical rim) a bilateral helical rim advancement flap was deemed most appropriate.  Using a sterile surgical marker, the appropriate advancement flaps were drawn incorporating the defect and placing the expected incisions between the helical rim and antihelix where possible.  The area thus outlined was incised through and through with a #15 scalpel blade.  With a skin hook and iris scissors, the flaps were gently and sharply undermined and freed up. Ear Star Wedge Flap Text: The defect edges were debeveled with a #15 blade scalpel.  Given the location of the defect and the proximity to free margins (helical rim) an ear star wedge flap was deemed most appropriate.  Using a sterile surgical marker, the appropriate flap was drawn incorporating the defect and placing the expected incisions between the helical rim and antihelix where possible.  The area thus outlined was incised through and through with a #15 scalpel blade. Banner Transposition Flap Text: The defect edges were debeveled with a #15 scalpel blade.  Given the location of the defect and the proximity to free margins a Banner transposition flap was deemed most appropriate.  Using a sterile surgical marker, an appropriate flap drawn around the defect. The area thus outlined was incised deep to adipose tissue with a #15 scalpel blade.  The skin margins were undermined to an appropriate distance in all directions utilizing iris scissors. Bilobed Flap Text: The defect edges were debeveled with a #15 scalpel blade.  Given the location of the defect and the proximity to free margins a bilobe flap was deemed most appropriate.  Using a sterile surgical marker, an appropriate bilobe flap drawn around the defect.    The area thus outlined was incised deep to adipose tissue with a #15 scalpel blade.  The skin margins were undermined to an appropriate distance in all directions utilizing iris scissors. Bilobed Transposition Flap Text: The defect edges were debeveled with a #15 scalpel blade.  Given the location of the defect and the proximity to free margins a bilobed transposition flap was deemed most appropriate.  Using a sterile surgical marker, an appropriate bilobe flap drawn around the defect.    The area thus outlined was incised deep to adipose tissue with a #15 scalpel blade.  The skin margins were undermined to an appropriate distance in all directions utilizing iris scissors. Trilobed Flap Text: The defect edges were debeveled with a #15 scalpel blade.  Given the location of the defect and the proximity to free margins a trilobed flap was deemed most appropriate.  Using a sterile surgical marker, an appropriate trilobed flap drawn around the defect.    The area thus outlined was incised deep to adipose tissue with a #15 scalpel blade.  The skin margins were undermined to an appropriate distance in all directions utilizing iris scissors. Dorsal Nasal Flap Text: The defect edges were debeveled with a #15 scalpel blade.  Given the location of the defect and the proximity to free margins a dorsal nasal flap was deemed most appropriate.  Using a sterile surgical marker, an appropriate dorsal nasal flap was drawn around the defect.    The area thus outlined was incised deep to adipose tissue with a #15 scalpel blade.  The skin margins were undermined to an appropriate distance in all directions utilizing iris scissors. Island Pedicle Flap Text: The defect edges were debeveled with a #15 scalpel blade.  Given the location of the defect, shape of the defect and the proximity to free margins an island pedicle advancement flap was deemed most appropriate.  Using a sterile surgical marker, an appropriate advancement flap was drawn incorporating the defect, outlining the appropriate donor tissue and placing the expected incisions within the relaxed skin tension lines where possible.    The area thus outlined was incised deep to adipose tissue with a #15 scalpel blade.  The skin margins were undermined to an appropriate distance in all directions around the primary defect and laterally outward around the island pedicle utilizing iris scissors.  There was minimal undermining beneath the pedicle flap. Island Pedicle Flap With Canthal Suspension Text: The defect edges were debeveled with a #15 scalpel blade.  Given the location of the defect, shape of the defect and the proximity to free margins an island pedicle advancement flap was deemed most appropriate.  Using a sterile surgical marker, an appropriate advancement flap was drawn incorporating the defect, outlining the appropriate donor tissue and placing the expected incisions within the relaxed skin tension lines where possible. The area thus outlined was incised deep to adipose tissue with a #15 scalpel blade.  The skin margins were undermined to an appropriate distance in all directions around the primary defect and laterally outward around the island pedicle utilizing iris scissors.  There was minimal undermining beneath the pedicle flap. A suspension suture was placed in the canthal tendon to prevent tension and prevent ectropion. Alar Island Pedicle Flap Text: The defect edges were debeveled with a #15 scalpel blade.  Given the location of the defect, shape of the defect and the proximity to the alar rim an island pedicle advancement flap was deemed most appropriate.  Using a sterile surgical marker, an appropriate advancement flap was drawn incorporating the defect, outlining the appropriate donor tissue and placing the expected incisions within the nasal ala running parallel to the alar rim. The area thus outlined was incised with a #15 scalpel blade.  The skin margins were undermined minimally to an appropriate distance in all directions around the primary defect and laterally outward around the island pedicle utilizing iris scissors.  There was minimal undermining beneath the pedicle flap. Double Island Pedicle Flap Text: The defect edges were debeveled with a #15 scalpel blade.  Given the location of the defect, shape of the defect and the proximity to free margins a double island pedicle advancement flap was deemed most appropriate.  Using a sterile surgical marker, an appropriate advancement flap was drawn incorporating the defect, outlining the appropriate donor tissue and placing the expected incisions within the relaxed skin tension lines where possible.    The area thus outlined was incised deep to adipose tissue with a #15 scalpel blade.  The skin margins were undermined to an appropriate distance in all directions around the primary defect and laterally outward around the island pedicle utilizing iris scissors.  There was minimal undermining beneath the pedicle flap. Island Pedicle Flap-Requiring Vessel Identification Text: The defect edges were debeveled with a #15 scalpel blade.  Given the location of the defect, shape of the defect and the proximity to free margins an island pedicle advancement flap was deemed most appropriate.  Using a sterile surgical marker, an appropriate advancement flap was drawn, based on the axial vessel mentioned above, incorporating the defect, outlining the appropriate donor tissue and placing the expected incisions within the relaxed skin tension lines where possible.    The area thus outlined was incised deep to adipose tissue with a #15 scalpel blade.  The skin margins were undermined to an appropriate distance in all directions around the primary defect and laterally outward around the island pedicle utilizing iris scissors.  There was minimal undermining beneath the pedicle flap. Keystone Flap Text: The defect edges were debeveled with a #15 scalpel blade.  Given the location of the defect, shape of the defect a keystone flap was deemed most appropriate.  Using a sterile surgical marker, an appropriate keystone flap was drawn incorporating the defect, outlining the appropriate donor tissue and placing the expected incisions within the relaxed skin tension lines where possible. The area thus outlined was incised deep to adipose tissue with a #15 scalpel blade.  The skin margins were undermined to an appropriate distance in all directions around the primary defect and laterally outward around the flap utilizing iris scissors. O-T Plasty Text: The defect edges were debeveled with a #15 scalpel blade.  Given the location of the defect, shape of the defect and the proximity to free margins an O-T plasty was deemed most appropriate.  Using a sterile surgical marker, an appropriate O-T plasty was drawn incorporating the defect and placing the expected incisions within the relaxed skin tension lines where possible.    The area thus outlined was incised deep to adipose tissue with a #15 scalpel blade.  The skin margins were undermined to an appropriate distance in all directions utilizing iris scissors. O-Z Plasty Text: The defect edges were debeveled with a #15 scalpel blade.  Given the location of the defect, shape of the defect and the proximity to free margins an O-Z plasty (double transposition flap) was deemed most appropriate.  Using a sterile surgical marker, the appropriate transposition flaps were drawn incorporating the defect and placing the expected incisions within the relaxed skin tension lines where possible.    The area thus outlined was incised deep to adipose tissue with a #15 scalpel blade.  The skin margins were undermined to an appropriate distance in all directions utilizing iris scissors.  Hemostasis was achieved with electrocautery.  The flaps were then transposed into place, one clockwise and the other counterclockwise, and anchored with interrupted buried subcutaneous sutures. Double O-Z Plasty Text: The defect edges were debeveled with a #15 scalpel blade.  Given the location of the defect, shape of the defect and the proximity to free margins a Double O-Z plasty (double transposition flap) was deemed most appropriate.  Using a sterile surgical marker, the appropriate transposition flaps were drawn incorporating the defect and placing the expected incisions within the relaxed skin tension lines where possible. The area thus outlined was incised deep to adipose tissue with a #15 scalpel blade.  The skin margins were undermined to an appropriate distance in all directions utilizing iris scissors.  Hemostasis was achieved with electrocautery.  The flaps were then transposed into place, one clockwise and the other counterclockwise, and anchored with interrupted buried subcutaneous sutures. V-Y Plasty Text: The defect edges were debeveled with a #15 scalpel blade.  Given the location of the defect, shape of the defect and the proximity to free margins an V-Y advancement flap was deemed most appropriate.  Using a sterile surgical marker, an appropriate advancement flap was drawn incorporating the defect and placing the expected incisions within the relaxed skin tension lines where possible.    The area thus outlined was incised deep to adipose tissue with a #15 scalpel blade.  The skin margins were undermined to an appropriate distance in all directions utilizing iris scissors. H Plasty Text: Given the location of the defect, shape of the defect and the proximity to free margins a H-plasty was deemed most appropriate for repair.  Using a sterile surgical marker, the appropriate advancement arms of the H-plasty were drawn incorporating the defect and placing the expected incisions within the relaxed skin tension lines where possible. The area thus outlined was incised deep to adipose tissue with a #15 scalpel blade. The skin margins were undermined to an appropriate distance in all directions utilizing iris scissors.  The opposing advancement arms were then advanced into place in opposite direction and anchored with interrupted buried subcutaneous sutures. W Plasty Text: The lesion was extirpated to the level of the fat with a #15 scalpel blade.  Given the location of the defect, shape of the defect and the proximity to free margins a W-plasty was deemed most appropriate for repair.  Using a sterile surgical marker, the appropriate transposition arms of the W-plasty were drawn incorporating the defect and placing the expected incisions within the relaxed skin tension lines where possible.    The area thus outlined was incised deep to adipose tissue with a #15 scalpel blade.  The skin margins were undermined to an appropriate distance in all directions utilizing iris scissors.  The opposing transposition arms were then transposed into place in opposite direction and anchored with interrupted buried subcutaneous sutures. Z Plasty Text: The lesion was extirpated to the level of the fat with a #15 scalpel blade.  Given the location of the defect, shape of the defect and the proximity to free margins a Z-plasty was deemed most appropriate for repair.  Using a sterile surgical marker, the appropriate transposition arms of the Z-plasty were drawn incorporating the defect and placing the expected incisions within the relaxed skin tension lines where possible.    The area thus outlined was incised deep to adipose tissue with a #15 scalpel blade.  The skin margins were undermined to an appropriate distance in all directions utilizing iris scissors.  The opposing transposition arms were then transposed into place in opposite direction and anchored with interrupted buried subcutaneous sutures. Zygomaticofacial Flap Text: Given the location of the defect, shape of the defect and the proximity to free margins a zygomaticofacial flap was deemed most appropriate for repair.  Using a sterile surgical marker, the appropriate flap was drawn incorporating the defect and placing the expected incisions within the relaxed skin tension lines where possible. The area thus outlined was incised deep to adipose tissue with a #15 scalpel blade with preservation of a vascular pedicle.  The skin margins were undermined to an appropriate distance in all directions utilizing iris scissors.  The flap was then placed into the defect and anchored with interrupted buried subcutaneous sutures. Cheek Interpolation Flap Text: A decision was made to reconstruct the defect utilizing an interpolation axial flap and a staged reconstruction.  A telfa template was made of the defect.  This telfa template was then used to outline the Cheek Interpolation flap.  The donor area for the pedicle flap was then injected with anesthesia.  The flap was excised through the skin and subcutaneous tissue down to the layer of the underlying musculature.  The interpolation flap was carefully excised within this deep plane to maintain its blood supply.  The edges of the donor site were undermined.   The donor site was closed in a primary fashion.  The pedicle was then rotated into position and sutured.  Once the tube was sutured into place, adequate blood supply was confirmed with blanching and refill.  The pedicle was then wrapped with xeroform gauze and dressed appropriately with a telfa and gauze bandage to ensure continued blood supply and protect the attached pedicle. Cheek-To-Nose Interpolation Flap Text: A decision was made to reconstruct the defect utilizing an interpolation axial flap and a staged reconstruction.  A telfa template was made of the defect.  This telfa template was then used to outline the Cheek-To-Nose Interpolation flap.  The donor area for the pedicle flap was then injected with anesthesia.  The flap was excised through the skin and subcutaneous tissue down to the layer of the underlying musculature.  The interpolation flap was carefully excised within this deep plane to maintain its blood supply.  The edges of the donor site were undermined.   The donor site was closed in a primary fashion.  The pedicle was then rotated into position and sutured.  Once the tube was sutured into place, adequate blood supply was confirmed with blanching and refill.  The pedicle was then wrapped with xeroform gauze and dressed appropriately with a telfa and gauze bandage to ensure continued blood supply and protect the attached pedicle. Interpolation Flap Text: A decision was made to reconstruct the defect utilizing an interpolation axial flap and a staged reconstruction.  A telfa template was made of the defect.  This telfa template was then used to outline the interpolation flap.  The donor area for the pedicle flap was then injected with anesthesia.  The flap was excised through the skin and subcutaneous tissue down to the layer of the underlying musculature.  The interpolation flap was carefully excised within this deep plane to maintain its blood supply.  The edges of the donor site were undermined.   The donor site was closed in a primary fashion.  The pedicle was then rotated into position and sutured.  Once the tube was sutured into place, adequate blood supply was confirmed with blanching and refill.  The pedicle was then wrapped with xeroform gauze and dressed appropriately with a telfa and gauze bandage to ensure continued blood supply and protect the attached pedicle. Melolabial Interpolation Flap Text: A decision was made to reconstruct the defect utilizing an interpolation axial flap and a staged reconstruction.  A telfa template was made of the defect.  This telfa template was then used to outline the melolabial interpolation flap.  The donor area for the pedicle flap was then injected with anesthesia.  The flap was excised through the skin and subcutaneous tissue down to the layer of the underlying musculature.  The pedicle flap was carefully excised within this deep plane to maintain its blood supply.  The edges of the donor site were undermined.   The donor site was closed in a primary fashion.  The pedicle was then rotated into position and sutured.  Once the tube was sutured into place, adequate blood supply was confirmed with blanching and refill.  The pedicle was then wrapped with xeroform gauze and dressed appropriately with a telfa and gauze bandage to ensure continued blood supply and protect the attached pedicle. Mastoid Interpolation Flap Text: A decision was made to reconstruct the defect utilizing an interpolation axial flap and a staged reconstruction.  A telfa template was made of the defect.  This telfa template was then used to outline the mastoid interpolation flap.  The donor area for the pedicle flap was then injected with anesthesia.  The flap was excised through the skin and subcutaneous tissue down to the layer of the underlying musculature.  The pedicle flap was carefully excised within this deep plane to maintain its blood supply.  The edges of the donor site were undermined.   The donor site was closed in a primary fashion.  The pedicle was then rotated into position and sutured.  Once the tube was sutured into place, adequate blood supply was confirmed with blanching and refill.  The pedicle was then wrapped with xeroform gauze and dressed appropriately with a telfa and gauze bandage to ensure continued blood supply and protect the attached pedicle. Posterior Auricular Interpolation Flap Text: A decision was made to reconstruct the defect utilizing an interpolation axial flap and a staged reconstruction.  A telfa template was made of the defect.  This telfa template was then used to outline the posterior auricular interpolation flap.  The donor area for the pedicle flap was then injected with anesthesia.  The flap was excised through the skin and subcutaneous tissue down to the layer of the underlying musculature.  The pedicle flap was carefully excised within this deep plane to maintain its blood supply.  The edges of the donor site were undermined.   The donor site was closed in a primary fashion.  The pedicle was then rotated into position and sutured.  Once the tube was sutured into place, adequate blood supply was confirmed with blanching and refill.  The pedicle was then wrapped with xeroform gauze and dressed appropriately with a telfa and gauze bandage to ensure continued blood supply and protect the attached pedicle. Paramedian Forehead Flap Text: A decision was made to reconstruct the defect utilizing an interpolation axial flap and a staged reconstruction.  A telfa template was made of the defect.  This telfa template was then used to outline the paramedian forehead pedicle flap.  The donor area for the pedicle flap was then injected with anesthesia.  The flap was excised through the skin and subcutaneous tissue down to the layer of the underlying musculature.  The pedicle flap was carefully excised within this deep plane to maintain its blood supply.  The edges of the donor site were undermined.   The donor site was closed in a primary fashion.  The pedicle was then rotated into position and sutured.  Once the tube was sutured into place, adequate blood supply was confirmed with blanching and refill.  The pedicle was then wrapped with xeroform gauze and dressed appropriately with a telfa and gauze bandage to ensure continued blood supply and protect the attached pedicle. Abbe Flap (Upper To Lower Lip) Text: The defect of the lower lip was assessed and measured.  Given the location and size of the defect, an Abbe flap was deemed most appropriate. Using a sterile surgical marker, an appropriate Abbe flap was measured and drawn on the upper lip. Local anesthesia was then infiltrated.  A scalpel was then used to incise the upper lip through and through the skin, vermilion, muscle and mucosa, leaving the flap pedicled on the opposite side.  The flap was then rotated and transferred to the lower lip defect.  The flap was then sutured into place with a three layer technique, closing the orbicularis oris muscle layer with subcutaneous buried sutures, followed by a mucosal layer and an epidermal layer. Abbe Flap (Lower To Upper Lip) Text: The defect of the upper lip was assessed and measured.  Given the location and size of the defect, an Abbe flap was deemed most appropriate. Using a sterile surgical marker, an appropriate Abbe flap was measured and drawn on the lower lip. Local anesthesia was then infiltrated. A scalpel was then used to incise the upper lip through and through the skin, vermilion, muscle and mucosa, leaving the flap pedicled on the opposite side.  The flap was then rotated and transferred to the lower lip defect.  The flap was then sutured into place with a three layer technique, closing the orbicularis oris muscle layer with subcutaneous buried sutures, followed by a mucosal layer and an epidermal layer. Estlander Flap (Upper To Lower Lip) Text: The defect of the lower lip was assessed and measured.  Given the location and size of the defect, an Estlander flap was deemed most appropriate. Using a sterile surgical marker, an appropriate Estlander flap was measured and drawn on the upper lip. Local anesthesia was then infiltrated. A scalpel was then used to incise the lateral aspect of the flap, through skin, muscle and mucosa, leaving the flap pedicled medially.  The flap was then rotated and positioned to fill the lower lip defect.  The flap was then sutured into place with a three layer technique, closing the orbicularis oris muscle layer with subcutaneous buried sutures, followed by a mucosal layer and an epidermal layer. Lip Wedge Excision Repair Text: Given the location of the defect and the proximity to free margins a full thickness wedge repair was deemed most appropriate.  Using a sterile surgical marker, the appropriate repair was drawn incorporating the defect and placing the expected incisions perpendicular to the vermilion border.  The vermilion border was also meticulously outlined to ensure appropriate reapproximation during the repair.  The area thus outlined was incised through and through with a #15 scalpel blade.  The muscularis and dermis were reaproximated with deep sutures following hemostasis. Care was taken to realign the vermilion border before proceeding with the superficial closure.  Once the vermilion was realigned the superfical and mucosal closure was finished. Ftsg Text: The defect edges were debeveled with a #15 scalpel blade.  Given the location of the defect, shape of the defect and the proximity to free margins a full thickness skin graft was deemed most appropriate.  Using a sterile surgical marker, the primary defect shape was transferred to the donor site. The area thus outlined was incised deep to adipose tissue with a #15 scalpel blade.  The harvested graft was then trimmed of adipose tissue until only dermis and epidermis was left.  The skin margins of the secondary defect were undermined to an appropriate distance in all directions utilizing iris scissors.  The secondary defect was closed with interrupted buried subcutaneous sutures.  The skin edges were then re-apposed with running  sutures.  The skin graft was then placed in the primary defect and oriented appropriately. Split-Thickness Skin Graft Text: The defect edges were debeveled with a #15 scalpel blade.  Given the location of the defect, shape of the defect and the proximity to free margins a split thickness skin graft was deemed most appropriate.  Using a sterile surgical marker, the primary defect shape was transferred to the donor site. The split thickness graft was then harvested.  The skin graft was then placed in the primary defect and oriented appropriately. Burow's Graft Text: The defect edges were debeveled with a #15 scalpel blade.  Given the location of the defect, shape of the defect, the proximity to free margins and the presence of a standing cone deformity a Burow's skin graft was deemed most appropriate. The standing cone was removed and this tissue was then trimmed to the shape of the primary defect. The adipose tissue was also removed until only dermis and epidermis were left.  The skin margins of the secondary defect were undermined to an appropriate distance in all directions utilizing iris scissors.  The secondary defect was closed with interrupted buried subcutaneous sutures.  The skin edges were then re-apposed with running  sutures.  The skin graft was then placed in the primary defect and oriented appropriately. Cartilage Graft Text: The defect edges were debeveled with a #15 scalpel blade.  Given the location of the defect, shape of the defect, the fact the defect involved a full thickness cartilage defect a cartilage graft was deemed most appropriate.  An appropriate donor site was identified, cleansed, and anesthetized. The cartilage graft was then harvested and transferred to the recipient site, oriented appropriately and then sutured into place.  The secondary defect was then repaired using a primary closure. Composite Graft Text: The defect edges were debeveled with a #15 scalpel blade.  Given the location of the defect, shape of the defect, the proximity to free margins and the fact the defect was full thickness a composite graft was deemed most appropriate.  The defect was outline and then transferred to the donor site.  A full thickness graft was then excised from the donor site. The graft was then placed in the primary defect, oriented appropriately and then sutured into place.  The secondary defect was then repaired using a primary closure. Epidermal Autograft Text: The defect edges were debeveled with a #15 scalpel blade.  Given the location of the defect, shape of the defect and the proximity to free margins an epidermal autograft was deemed most appropriate.  Using a sterile surgical marker, the primary defect shape was transferred to the donor site. The epidermal graft was then harvested.  The skin graft was then placed in the primary defect and oriented appropriately. Dermal Autograft Text: The defect edges were debeveled with a #15 scalpel blade.  Given the location of the defect, shape of the defect and the proximity to free margins a dermal autograft was deemed most appropriate.  Using a sterile surgical marker, the primary defect shape was transferred to the donor site. The area thus outlined was incised deep to adipose tissue with a #15 scalpel blade.  The harvested graft was then trimmed of adipose and epidermal tissue until only dermis was left.  The skin graft was then placed in the primary defect and oriented appropriately. Skin Substitute Text: The defect edges were debeveled with a #15 scalpel blade.  Given the location of the defect, shape of the defect and the proximity to free margins a skin substitute graft was deemed most appropriate.  The graft material was trimmed to fit the size of the defect. The graft was then placed in the primary defect and oriented appropriately. Tissue Cultured Epidermal Autograft Text: The defect edges were debeveled with a #15 scalpel blade.  Given the location of the defect, shape of the defect and the proximity to free margins a tissue cultured epidermal autograft was deemed most appropriate.  The graft was then trimmed to fit the size of the defect.  The graft was then placed in the primary defect and oriented appropriately. Xenograft Text: The defect edges were debeveled with a #15 scalpel blade.  Given the location of the defect, shape of the defect and the proximity to free margins a xenograft was deemed most appropriate.  The graft was then trimmed to fit the size of the defect.  The graft was then placed in the primary defect and oriented appropriately. Purse String (Intermediate) Text: Given the location of the defect and the characteristics of the surrounding skin a purse string intermediate closure was deemed most appropriate.  Undermining was performed circumferentially around the surgical defect.  A purse string suture was then placed and tightened. Purse String (Simple) Text: Given the location of the defect and the characteristics of the surrounding skin a purse string simple closure was deemed most appropriate.  Undermining was performed circumferentially around the surgical defect.  A purse string suture was then placed and tightened. Complex Repair And Single Advancement Flap Text: The defect edges were debeveled with a #15 scalpel blade.  The primary defect was closed partially with a complex linear closure.  Given the location of the remaining defect, shape of the defect and the proximity to free margins a single advancement flap was deemed most appropriate for complete closure of the defect.  Using a sterile surgical marker, an appropriate advancement flap was drawn incorporating the defect and placing the expected incisions within the relaxed skin tension lines where possible.    The area thus outlined was incised deep to adipose tissue with a #15 scalpel blade.  The skin margins were undermined to an appropriate distance in all directions utilizing iris scissors. Complex Repair And Double Advancement Flap Text: The defect edges were debeveled with a #15 scalpel blade.  The primary defect was closed partially with a complex linear closure.  Given the location of the remaining defect, shape of the defect and the proximity to free margins a double advancement flap was deemed most appropriate for complete closure of the defect.  Using a sterile surgical marker, an appropriate advancement flap was drawn incorporating the defect and placing the expected incisions within the relaxed skin tension lines where possible.    The area thus outlined was incised deep to adipose tissue with a #15 scalpel blade.  The skin margins were undermined to an appropriate distance in all directions utilizing iris scissors. Complex Repair And Modified Advancement Flap Text: The defect edges were debeveled with a #15 scalpel blade.  The primary defect was closed partially with a complex linear closure.  Given the location of the remaining defect, shape of the defect and the proximity to free margins a modified advancement flap was deemed most appropriate for complete closure of the defect.  Using a sterile surgical marker, an appropriate advancement flap was drawn incorporating the defect and placing the expected incisions within the relaxed skin tension lines where possible.    The area thus outlined was incised deep to adipose tissue with a #15 scalpel blade.  The skin margins were undermined to an appropriate distance in all directions utilizing iris scissors. Complex Repair And A-T Advancement Flap Text: The defect edges were debeveled with a #15 scalpel blade.  The primary defect was closed partially with a complex linear closure.  Given the location of the remaining defect, shape of the defect and the proximity to free margins an A-T advancement flap was deemed most appropriate for complete closure of the defect.  Using a sterile surgical marker, an appropriate advancement flap was drawn incorporating the defect and placing the expected incisions within the relaxed skin tension lines where possible.    The area thus outlined was incised deep to adipose tissue with a #15 scalpel blade.  The skin margins were undermined to an appropriate distance in all directions utilizing iris scissors. Complex Repair And O-T Advancement Flap Text: The defect edges were debeveled with a #15 scalpel blade.  The primary defect was closed partially with a complex linear closure.  Given the location of the remaining defect, shape of the defect and the proximity to free margins an O-T advancement flap was deemed most appropriate for complete closure of the defect.  Using a sterile surgical marker, an appropriate advancement flap was drawn incorporating the defect and placing the expected incisions within the relaxed skin tension lines where possible.    The area thus outlined was incised deep to adipose tissue with a #15 scalpel blade.  The skin margins were undermined to an appropriate distance in all directions utilizing iris scissors. Complex Repair And O-L Flap Text: The defect edges were debeveled with a #15 scalpel blade.  The primary defect was closed partially with a complex linear closure.  Given the location of the remaining defect, shape of the defect and the proximity to free margins an O-L flap was deemed most appropriate for complete closure of the defect.  Using a sterile surgical marker, an appropriate flap was drawn incorporating the defect and placing the expected incisions within the relaxed skin tension lines where possible.    The area thus outlined was incised deep to adipose tissue with a #15 scalpel blade.  The skin margins were undermined to an appropriate distance in all directions utilizing iris scissors. Complex Repair And Bilobe Flap Text: The defect edges were debeveled with a #15 scalpel blade.  The primary defect was closed partially with a complex linear closure.  Given the location of the remaining defect, shape of the defect and the proximity to free margins a bilobe flap was deemed most appropriate for complete closure of the defect.  Using a sterile surgical marker, an appropriate advancement flap was drawn incorporating the defect and placing the expected incisions within the relaxed skin tension lines where possible.    The area thus outlined was incised deep to adipose tissue with a #15 scalpel blade.  The skin margins were undermined to an appropriate distance in all directions utilizing iris scissors. Complex Repair And Melolabial Flap Text: The defect edges were debeveled with a #15 scalpel blade.  The primary defect was closed partially with a complex linear closure.  Given the location of the remaining defect, shape of the defect and the proximity to free margins a melolabial flap was deemed most appropriate for complete closure of the defect.  Using a sterile surgical marker, an appropriate advancement flap was drawn incorporating the defect and placing the expected incisions within the relaxed skin tension lines where possible.    The area thus outlined was incised deep to adipose tissue with a #15 scalpel blade.  The skin margins were undermined to an appropriate distance in all directions utilizing iris scissors. Complex Repair And Rotation Flap Text: The defect edges were debeveled with a #15 scalpel blade.  The primary defect was closed partially with a complex linear closure.  Given the location of the remaining defect, shape of the defect and the proximity to free margins a rotation flap was deemed most appropriate for complete closure of the defect.  Using a sterile surgical marker, an appropriate advancement flap was drawn incorporating the defect and placing the expected incisions within the relaxed skin tension lines where possible.    The area thus outlined was incised deep to adipose tissue with a #15 scalpel blade.  The skin margins were undermined to an appropriate distance in all directions utilizing iris scissors. Complex Repair And Rhombic Flap Text: The defect edges were debeveled with a #15 scalpel blade.  The primary defect was closed partially with a complex linear closure.  Given the location of the remaining defect, shape of the defect and the proximity to free margins a rhombic flap was deemed most appropriate for complete closure of the defect.  Using a sterile surgical marker, an appropriate advancement flap was drawn incorporating the defect and placing the expected incisions within the relaxed skin tension lines where possible.    The area thus outlined was incised deep to adipose tissue with a #15 scalpel blade.  The skin margins were undermined to an appropriate distance in all directions utilizing iris scissors. Complex Repair And Transposition Flap Text: The defect edges were debeveled with a #15 scalpel blade.  The primary defect was closed partially with a complex linear closure.  Given the location of the remaining defect, shape of the defect and the proximity to free margins a transposition flap was deemed most appropriate for complete closure of the defect.  Using a sterile surgical marker, an appropriate advancement flap was drawn incorporating the defect and placing the expected incisions within the relaxed skin tension lines where possible.    The area thus outlined was incised deep to adipose tissue with a #15 scalpel blade.  The skin margins were undermined to an appropriate distance in all directions utilizing iris scissors. Complex Repair And V-Y Plasty Text: The defect edges were debeveled with a #15 scalpel blade.  The primary defect was closed partially with a complex linear closure.  Given the location of the remaining defect, shape of the defect and the proximity to free margins a V-Y plasty was deemed most appropriate for complete closure of the defect.  Using a sterile surgical marker, an appropriate advancement flap was drawn incorporating the defect and placing the expected incisions within the relaxed skin tension lines where possible.    The area thus outlined was incised deep to adipose tissue with a #15 scalpel blade.  The skin margins were undermined to an appropriate distance in all directions utilizing iris scissors. Complex Repair And M Plasty Text: The defect edges were debeveled with a #15 scalpel blade.  The primary defect was closed partially with a complex linear closure.  Given the location of the remaining defect, shape of the defect and the proximity to free margins an M plasty was deemed most appropriate for complete closure of the defect.  Using a sterile surgical marker, an appropriate advancement flap was drawn incorporating the defect and placing the expected incisions within the relaxed skin tension lines where possible.    The area thus outlined was incised deep to adipose tissue with a #15 scalpel blade.  The skin margins were undermined to an appropriate distance in all directions utilizing iris scissors. Complex Repair And Double M Plasty Text: The defect edges were debeveled with a #15 scalpel blade.  The primary defect was closed partially with a complex linear closure.  Given the location of the remaining defect, shape of the defect and the proximity to free margins a double M plasty was deemed most appropriate for complete closure of the defect.  Using a sterile surgical marker, an appropriate advancement flap was drawn incorporating the defect and placing the expected incisions within the relaxed skin tension lines where possible.    The area thus outlined was incised deep to adipose tissue with a #15 scalpel blade.  The skin margins were undermined to an appropriate distance in all directions utilizing iris scissors. Complex Repair And W Plasty Text: The defect edges were debeveled with a #15 scalpel blade.  The primary defect was closed partially with a complex linear closure.  Given the location of the remaining defect, shape of the defect and the proximity to free margins a W plasty was deemed most appropriate for complete closure of the defect.  Using a sterile surgical marker, an appropriate advancement flap was drawn incorporating the defect and placing the expected incisions within the relaxed skin tension lines where possible.    The area thus outlined was incised deep to adipose tissue with a #15 scalpel blade.  The skin margins were undermined to an appropriate distance in all directions utilizing iris scissors. Complex Repair And Z Plasty Text: The defect edges were debeveled with a #15 scalpel blade.  The primary defect was closed partially with a complex linear closure.  Given the location of the remaining defect, shape of the defect and the proximity to free margins a Z plasty was deemed most appropriate for complete closure of the defect.  Using a sterile surgical marker, an appropriate advancement flap was drawn incorporating the defect and placing the expected incisions within the relaxed skin tension lines where possible.    The area thus outlined was incised deep to adipose tissue with a #15 scalpel blade.  The skin margins were undermined to an appropriate distance in all directions utilizing iris scissors. Complex Repair And Dorsal Nasal Flap Text: The defect edges were debeveled with a #15 scalpel blade.  The primary defect was closed partially with a complex linear closure.  Given the location of the remaining defect, shape of the defect and the proximity to free margins a dorsal nasal flap was deemed most appropriate for complete closure of the defect.  Using a sterile surgical marker, an appropriate flap was drawn incorporating the defect and placing the expected incisions within the relaxed skin tension lines where possible.    The area thus outlined was incised deep to adipose tissue with a #15 scalpel blade.  The skin margins were undermined to an appropriate distance in all directions utilizing iris scissors. Complex Repair And Ftsg Text: The defect edges were debeveled with a #15 scalpel blade.  The primary defect was closed partially with a complex linear closure.  Given the location of the defect, shape of the defect and the proximity to free margins a full thickness skin graft was deemed most appropriate to repair the remaining defect.  The graft was trimmed to fit the size of the remaining defect.  The graft was then placed in the primary defect, oriented appropriately, and sutured into place. Complex Repair And Burow's Graft Text: The defect edges were debeveled with a #15 scalpel blade.  The primary defect was closed partially with a complex linear closure.  Given the location of the defect, shape of the defect, the proximity to free margins and the presence of a standing cone deformity a Burow's graft was deemed most appropriate to repair the remaining defect.  The graft was trimmed to fit the size of the remaining defect.  The graft was then placed in the primary defect, oriented appropriately, and sutured into place. Complex Repair And Split-Thickness Skin Graft Text: The defect edges were debeveled with a #15 scalpel blade.  The primary defect was closed partially with a complex linear closure.  Given the location of the defect, shape of the defect and the proximity to free margins a split thickness skin graft was deemed most appropriate to repair the remaining defect.  The graft was trimmed to fit the size of the remaining defect.  The graft was then placed in the primary defect, oriented appropriately, and sutured into place. Complex Repair And Epidermal Autograft Text: The defect edges were debeveled with a #15 scalpel blade.  The primary defect was closed partially with a complex linear closure.  Given the location of the defect, shape of the defect and the proximity to free margins an epidermal autograft was deemed most appropriate to repair the remaining defect.  The graft was trimmed to fit the size of the remaining defect.  The graft was then placed in the primary defect, oriented appropriately, and sutured into place. Complex Repair And Dermal Autograft Text: The defect edges were debeveled with a #15 scalpel blade.  The primary defect was closed partially with a complex linear closure.  Given the location of the defect, shape of the defect and the proximity to free margins an dermal autograft was deemed most appropriate to repair the remaining defect.  The graft was trimmed to fit the size of the remaining defect.  The graft was then placed in the primary defect, oriented appropriately, and sutured into place. Complex Repair And Tissue Cultured Epidermal Autograft Text: The defect edges were debeveled with a #15 scalpel blade.  The primary defect was closed partially with a complex linear closure.  Given the location of the defect, shape of the defect and the proximity to free margins an tissue cultured epidermal autograft was deemed most appropriate to repair the remaining defect.  The graft was trimmed to fit the size of the remaining defect.  The graft was then placed in the primary defect, oriented appropriately, and sutured into place. Complex Repair And Xenograft Text: The defect edges were debeveled with a #15 scalpel blade.  The primary defect was closed partially with a complex linear closure.  Given the location of the defect, shape of the defect and the proximity to free margins a xenograft was deemed most appropriate to repair the remaining defect.  The graft was trimmed to fit the size of the remaining defect.  The graft was then placed in the primary defect, oriented appropriately, and sutured into place. Complex Repair And Skin Substitute Graft Text: The defect edges were debeveled with a #15 scalpel blade.  The primary defect was closed partially with a complex linear closure.  Given the location of the remaining defect, shape of the defect and the proximity to free margins a skin substitute graft was deemed most appropriate to repair the remaining defect.  The graft was trimmed to fit the size of the remaining defect.  The graft was then placed in the primary defect, oriented appropriately, and sutured into place. Path Notes (To The Dermatopathologist): Please check margins. Medical Necessity Clause: This procedure was medically necessary because the lesion that was treated was: Consent was obtained from the patient. The risks and benefits to therapy were discussed in detail. Specifically, the risks of infection, scarring, bleeding, prolonged wound healing, incomplete removal, allergy to anesthesia, nerve injury and recurrence were addressed. Prior to the procedure, the treatment site was clearly identified and confirmed by the patient. All components of Universal Protocol/PAUSE Rule completed. Post-Care Instructions: I reviewed with the patient in detail post-care instructions. Patient is not to engage in any heavy lifting, exercise, or swimming for the next 14 days. Should the patient develop any fevers, chills, bleeding, severe pain patient will contact the office immediately. Home Suture Removal Text: Patient was provided a home suture removal kit and will remove their sutures at home.  If they have any questions or difficulties they will call the office. Where Do You Want The Question To Include Opioid Counseling Located?: Case Summary Tab Billing Type: Third-Party Bill Information: Selecting Yes will display possible errors in your note based on the variables you have selected. This validation is only offered as a suggestion for you. PLEASE NOTE THAT THE VALIDATION TEXT WILL BE REMOVED WHEN YOU FINALIZE YOUR NOTE. IF YOU WANT TO FAX A PRELIMINARY NOTE YOU WILL NEED TO TOGGLE THIS TO 'NO' IF YOU DO NOT WANT IT IN YOUR FAXED NOTE. Lab Facility: [id number]

## 2025-07-23 NOTE — PROGRESS NOTES
Occupational Therapy    Evaluation    Patient Name: Maddie Briseno  MRN: [id number]  Department: [facility]  Room: [address] [place] [address]  Today's Date: 7/23/2025  Time Calculation  Start Time: 1442  Stop Time: 1458  Time Calculation (min): 16 min    Assessment  IP OT Assessment  OT Assessment: Pt. presents with a decline in self-care, mobility and safety and would benefit from skilled OT services to maximize independence and promote return to prior level of function.  Prognosis: Good  Barriers to Discharge Home: No anticipated barriers  End of Session Communication: Bedside nurse  End of Session Patient Position: Bed, 2 rail up, Alarm on (ice pack applied to L knee)  Plan:  Treatment Interventions: ADL retraining, Functional transfer training, Compensatory technique education  OT Frequency: Daily  OT Discharge Recommendations: Low intensity level of continued care  OT Recommended Transfer Status: Assist of 1  OT - OK to Discharge: Yes (to next level of care when cleared by medical team)    Subjective   Current Problem:  1. Arthritis of left knee  Referral to Home Health    Surgical Pathology Exam    Surgical Pathology Exam        OT Visit Info:  OT Received On: 07/23/25  General Visit Info:  General  Reason for Referral: impaired adl; pt. admitted for L TKR 7/23/25  Referred By: Nolan  Past Medical History Relevant to Rehab: htn, hld, ckd, pfo, oa  Co-Treatment: PT  Co-Treatment Reason: to maximize patient safety/abilities  Prior to Session Communication: Bedside nurse  Patient Position Received: Bed, 2 rail up, Alarm on  General Comment: pt. agreeable to therapy intervention  Precautions:  Precautions Comment: wbat LLE, out of bed with assist     Date/Time Vitals Session Patient Position Pulse Resp SpO2 BP MAP (mmHg)    07/23/25 15:21:43 --  --  --  16  95 %  118/56  81           Pain:  Pain Assessment  Pain Assessment: 0-10  0-10 (Numeric) Pain Score: 0 - No pain    Objective   Cognition:  Overall Cognitive Status: Within  Functional Limits           Home Living:  Type of Home: House  Lives With: Spouse  Home Adaptive Equipment: Cane, Walker rolling or standard  Home Layout: One level  Home Access:  (2 step entry with rail)  Bathroom Shower/Tub: Tub/shower unit, Walk-in shower  Bathroom Toilet: Handicapped height  Bathroom Equipment: Hand-held shower hose in the walk in shower, Bedside commode  Home Living Comments: standard bed, plans to sleep in recliner possibly, pt. has 2, 3 in 1 commode seats, one for the shower and one for the toilet   Prior Function:  Level of [place]: Independent with ADLs and functional transfers, Independent with homemaking with ambulation  Prior Function Comments: use of st. cane prior to surgery, drives  ADL:  Eating Assistance: Independent  Grooming Assistance: Independent  Bathing Assistance: Moderate  LE Dressing Assistance: Moderate  Toileting Assistance with Device: Moderate  ADL Comments: estimated and per RN report for toiletin  Activity Tolerance:  Endurance: Endurance does not limit participation in activity  Bed Mobility/Transfers: Bed Mobility  Bed Mobility:  (supine <> sit:  sba)    Transfers  Transfer:  (sit<> stand from eob:  cga with cues for hand placement)      Functional Mobility:  Functional Mobility  Functional Mobility Performed:  (min assist x 1 via wh. walker, cues for safe walker positioning/sequencing)  Sensation:  Sensation Comment: sensation intact  Strength:  Strength Comments: bue's at least 3/5 per observation  Coordination:  Movements are Fluid and Coordinated: Yes   Outcome Measures: [facility] Daily Activity  Putting on and taking off regular lower body clothing: A lot  Bathing (including washing, rinsing, drying): A lot  Putting on and taking off regular upper body clothing: A little  Toileting, which includes using toilet, bedpan or urinal: A little  Taking care of personal grooming such as brushing teeth: None  Eating Meals: None  Daily Activity - Total Score:  18      Education Documentation  Precautions, taught by Rafaela Snell OT at 7/23/2025  3:33 PM.  Learner: Patient  Readiness: Acceptance  Method: Explanation  Response: Verbalizes Understanding, Needs Reinforcement  Comment: OT POC    ADL Training, taught by Rafaela Snell OT at 7/23/2025  3:33 PM.  Learner: Patient  Readiness: Acceptance  Method: Explanation  Response: Verbalizes Understanding, Needs Reinforcement  Comment: OT POC        Goals:   Encounter Problems       Encounter Problems (Active)       OT Goals       Increase functional mobility and  functional transfers to supervision for bed/chair/toilet/shower/car with dme prn   (Progressing)       Start:  07/23/25    Expected End:  07/30/25            increase standing tolerance x 3-5 minutes with supervision to promote greater activity tolerance for completion of adl/transfers  (Progressing)       Start:  07/23/25    Expected End:  07/30/25            Increase lb dressing to supervision with dme prn  (Progressing)       Start:  07/23/25    Expected End:  07/30/25            Increase toileting to supervision with dme prn  (Progressing)       Start:  07/23/25    Expected End:  07/30/25            Pt. To adhere to walker safety techniques with supervision for assist/safety with transfers/adl/mobility with dme prn  (Progressing)       Start:  07/23/25    Expected End:  07/30/25

## 2025-07-24 ENCOUNTER — DOCUMENTATION (OUTPATIENT)
Dept: HOME HEALTH SERVICES | Facility: HOME HEALTH | Age: 75
End: 2025-07-24
Payer: MEDICARE

## 2025-07-24 ENCOUNTER — PHARMACY VISIT (OUTPATIENT)
Dept: PHARMACY | Facility: CLINIC | Age: 75
End: 2025-07-24
Payer: COMMERCIAL

## 2025-07-24 VITALS
HEART RATE: 63 BPM | HEIGHT: 62 IN | BODY MASS INDEX: 30.02 KG/M2 | OXYGEN SATURATION: 96 % | TEMPERATURE: 97.3 F | RESPIRATION RATE: 16 BRPM | DIASTOLIC BLOOD PRESSURE: 51 MMHG | WEIGHT: 163.14 LBS | SYSTOLIC BLOOD PRESSURE: 111 MMHG

## 2025-07-24 LAB
ANION GAP SERPL CALC-SCNC: 9 MMOL/L (ref 10–20)
BUN SERPL-MCNC: 12 MG/DL (ref 6–23)
CALCIUM SERPL-MCNC: 8.6 MG/DL (ref 8.6–10.3)
CHLORIDE SERPL-SCNC: 102 MMOL/L (ref 98–107)
CO2 SERPL-SCNC: 25 MMOL/L (ref 21–32)
CREAT SERPL-MCNC: 0.77 MG/DL (ref 0.5–1.05)
EGFRCR SERPLBLD CKD-EPI 2021: 81 ML/MIN/1.73M*2
ERYTHROCYTE [DISTWIDTH] IN BLOOD BY AUTOMATED COUNT: 12.9 % (ref 11.5–14.5)
GLUCOSE SERPL-MCNC: 117 MG/DL (ref 74–99)
HCT VFR BLD AUTO: 33.1 % (ref 36–46)
HGB BLD-MCNC: 11.1 G/DL (ref 12–16)
MCH RBC QN AUTO: 28.8 PG (ref 26–34)
MCHC RBC AUTO-ENTMCNC: 33.5 G/DL (ref 32–36)
MCV RBC AUTO: 86 FL (ref 80–100)
NRBC BLD-RTO: 0 /100 WBCS (ref 0–0)
PLATELET # BLD AUTO: 148 X10*3/UL (ref 150–450)
POTASSIUM SERPL-SCNC: 4.4 MMOL/L (ref 3.5–5.3)
RBC # BLD AUTO: 3.85 X10*6/UL (ref 4–5.2)
SODIUM SERPL-SCNC: 132 MMOL/L (ref 136–145)
WBC # BLD AUTO: 11 X10*3/UL (ref 4.4–11.3)

## 2025-07-24 PROCEDURE — 97116 GAIT TRAINING THERAPY: CPT | Mod: CQ,GP

## 2025-07-24 PROCEDURE — 7100000011 HC EXTENDED STAY RECOVERY HOURLY - NURSING UNIT

## 2025-07-24 PROCEDURE — RXMED WILLOW AMBULATORY MEDICATION CHARGE

## 2025-07-24 PROCEDURE — 2500000004 HC RX 250 GENERAL PHARMACY W/ HCPCS (ALT 636 FOR OP/ED)

## 2025-07-24 PROCEDURE — 97110 THERAPEUTIC EXERCISES: CPT | Mod: CQ,GP

## 2025-07-24 PROCEDURE — 97535 SELF CARE MNGMENT TRAINING: CPT | Mod: GO,CO

## 2025-07-24 PROCEDURE — 85027 COMPLETE CBC AUTOMATED: CPT

## 2025-07-24 PROCEDURE — 80048 BASIC METABOLIC PNL TOTAL CA: CPT

## 2025-07-24 PROCEDURE — 97535 SELF CARE MNGMENT TRAINING: CPT | Mod: CQ,GP

## 2025-07-24 PROCEDURE — 99232 SBSQ HOSP IP/OBS MODERATE 35: CPT

## 2025-07-24 PROCEDURE — 36415 COLL VENOUS BLD VENIPUNCTURE: CPT

## 2025-07-24 PROCEDURE — 2500000001 HC RX 250 WO HCPCS SELF ADMINISTERED DRUGS (ALT 637 FOR MEDICARE OP)

## 2025-07-24 RX ORDER — ONDANSETRON 4 MG/1
4 TABLET, FILM COATED ORAL EVERY 8 HOURS PRN
Qty: 20 TABLET | Refills: 0 | Status: SHIPPED | OUTPATIENT
Start: 2025-07-24

## 2025-07-24 RX ORDER — ACETAMINOPHEN 325 MG/1
650 TABLET ORAL EVERY 6 HOURS SCHEDULED
Qty: 80 TABLET | Refills: 0 | Status: SHIPPED | OUTPATIENT
Start: 2025-07-24 | End: 2025-08-03

## 2025-07-24 RX ORDER — POLYETHYLENE GLYCOL 3350 17 G/17G
17 POWDER, FOR SOLUTION ORAL DAILY
Qty: 15 PACKET | Refills: 0 | Status: SHIPPED | OUTPATIENT
Start: 2025-07-24

## 2025-07-24 RX ORDER — OXYCODONE HYDROCHLORIDE 5 MG/1
5 TABLET ORAL EVERY 6 HOURS PRN
Qty: 28 TABLET | Refills: 0 | Status: SHIPPED | OUTPATIENT
Start: 2025-07-24 | End: 2025-07-31

## 2025-07-24 RX ORDER — DOCUSATE SODIUM 100 MG/1
100 CAPSULE, LIQUID FILLED ORAL 2 TIMES DAILY
Qty: 30 CAPSULE | Refills: 0 | Status: SHIPPED | OUTPATIENT
Start: 2025-07-24 | End: 2025-08-08

## 2025-07-24 RX ORDER — TRAMADOL HYDROCHLORIDE 50 MG/1
50 TABLET, FILM COATED ORAL EVERY 6 HOURS PRN
Qty: 28 TABLET | Refills: 0 | Status: SHIPPED | OUTPATIENT
Start: 2025-07-24

## 2025-07-24 RX ORDER — ASPIRIN 81 MG/1
81 TABLET ORAL 2 TIMES DAILY
Qty: 60 TABLET | Refills: 0 | Status: SHIPPED | OUTPATIENT
Start: 2025-07-24

## 2025-07-24 RX ADMIN — OXYCODONE HYDROCHLORIDE 10 MG: 5 TABLET ORAL at 10:35

## 2025-07-24 RX ADMIN — DOCUSATE SODIUM 100 MG: 100 CAPSULE, LIQUID FILLED ORAL at 08:23

## 2025-07-24 RX ADMIN — ACETAMINOPHEN 650 MG: 325 TABLET ORAL at 00:09

## 2025-07-24 RX ADMIN — CEFAZOLIN SODIUM 2 G: 2 SOLUTION INTRAVENOUS at 00:10

## 2025-07-24 RX ADMIN — ACETAMINOPHEN 650 MG: 325 TABLET ORAL at 06:34

## 2025-07-24 RX ADMIN — OXYCODONE HYDROCHLORIDE 10 MG: 5 TABLET ORAL at 06:33

## 2025-07-24 RX ADMIN — POLYETHYLENE GLYCOL 3350 17 G: 17 POWDER, FOR SOLUTION ORAL at 08:23

## 2025-07-24 ASSESSMENT — PAIN SCALES - GENERAL
PAINLEVEL_OUTOF10: 2
PAINLEVEL_OUTOF10: 7
PAINLEVEL_OUTOF10: 7
PAINLEVEL_OUTOF10: 6
PAINLEVEL_OUTOF10: 2

## 2025-07-24 ASSESSMENT — COGNITIVE AND FUNCTIONAL STATUS - GENERAL
MOVING TO AND FROM BED TO CHAIR: A LITTLE
MOBILITY SCORE: 18
TOILETING: A LITTLE
WALKING IN HOSPITAL ROOM: A LITTLE
STANDING UP FROM CHAIR USING ARMS: A LITTLE
MOVING TO AND FROM BED TO CHAIR: A LITTLE
STANDING UP FROM CHAIR USING ARMS: A LITTLE
CLIMB 3 TO 5 STEPS WITH RAILING: A LITTLE
TURNING FROM BACK TO SIDE WHILE IN FLAT BAD: A LITTLE
CLIMB 3 TO 5 STEPS WITH RAILING: A LITTLE
DAILY ACTIVITIY SCORE: 21
DRESSING REGULAR LOWER BODY CLOTHING: A LITTLE
DAILY ACTIVITIY SCORE: 20
MOVING FROM LYING ON BACK TO SITTING ON SIDE OF FLAT BED WITH BEDRAILS: A LITTLE
MOBILITY SCORE: 18
DRESSING REGULAR LOWER BODY CLOTHING: A LITTLE
DRESSING REGULAR UPPER BODY CLOTHING: A LITTLE
TOILETING: A LITTLE
WALKING IN HOSPITAL ROOM: A LITTLE
MOVING FROM LYING ON BACK TO SITTING ON SIDE OF FLAT BED WITH BEDRAILS: A LITTLE
TURNING FROM BACK TO SIDE WHILE IN FLAT BAD: A LITTLE
HELP NEEDED FOR BATHING: A LOT

## 2025-07-24 ASSESSMENT — ACTIVITIES OF DAILY LIVING (ADL): HOME_MANAGEMENT_TIME_ENTRY: 42

## 2025-07-24 ASSESSMENT — PAIN DESCRIPTION - DESCRIPTORS
DESCRIPTORS: ACHING
DESCRIPTORS: ACHING;TENDER

## 2025-07-24 ASSESSMENT — PAIN - FUNCTIONAL ASSESSMENT
PAIN_FUNCTIONAL_ASSESSMENT: 0-10

## 2025-07-24 NOTE — HH CARE COORDINATION
Home Care received a Referral for Nursing and Physical Therapy. We have processed the referral for a Start of Care on 07/25/2025.     If you have any questions or concerns, please feel free to contact us at [phone]. Follow the prompts, enter your five digit zip code, and you will be directed to your care team on [facility].

## 2025-07-24 NOTE — CARE PLAN
Problem: Pain - Adult  Goal: Verbalizes/displays adequate comfort level or baseline comfort level  Outcome: Progressing     Problem: Safety - Adult  Goal: Free from fall injury  Outcome: Progressing     Problem: Discharge Planning  Goal: Discharge to home or other facility with appropriate resources  Outcome: Progressing     Problem: Chronic Conditions and Co-morbidities  Goal: Patient's chronic conditions and co-morbidity symptoms are monitored and maintained or improved  Outcome: Progressing     Problem: Nutrition  Goal: Nutrient intake appropriate for maintaining nutritional needs  Outcome: Progressing     Problem: Skin  Goal: Decreased wound size/increased tissue granulation at next dressing change  7/24/2025 0750 by Chantell Pina RN  Flowsheets (Taken 7/24/2025 0750)  Decreased wound size/increased tissue granulation at next dressing change: Promote sleep for wound healing  7/24/2025 0750 by Chantell Pina RN  Outcome: Progressing  Flowsheets (Taken 7/24/2025 0750)  Decreased wound size/increased tissue granulation at next dressing change: Promote sleep for wound healing  Goal: Participates in plan/prevention/treatment measures  7/24/2025 0750 by Chantell Pina RN  Flowsheets (Taken 7/24/2025 0750)  Participates in plan/prevention/treatment measures: Increase activity/out of bed for meals  7/24/2025 0750 by Chantell Pina RN  Outcome: Progressing  Flowsheets (Taken 7/24/2025 0750)  Participates in plan/prevention/treatment measures: Increase activity/out of bed for meals  Goal: Prevent/manage excess moisture  7/24/2025 0750 by Chantell Pina RN  Flowsheets (Taken 7/24/2025 0750)  Prevent/manage excess moisture: Moisturize dry skin  7/24/2025 0750 by Chantell Pina RN  Outcome: Progressing  Flowsheets (Taken 7/24/2025 0750)  Prevent/manage excess moisture: Moisturize dry skin  Goal: Prevent/minimize sheer/friction injuries  7/24/2025 0750 by Chantell Pina RN  Flowsheets (Taken 7/24/2025  0750)  Prevent/minimize sheer/friction injuries: Use pull sheet  7/24/2025 0750 by Chantell Pina RN  Outcome: Progressing  Flowsheets (Taken 7/24/2025 0750)  Prevent/minimize sheer/friction injuries: Use pull sheet  Goal: Promote/optimize nutrition  7/24/2025 0750 by Chantell Pina RN  Flowsheets (Taken 7/24/2025 0750)  Promote/optimize nutrition: Consume > 50% meals/supplements  7/24/2025 0750 by Chantell Pina RN  Outcome: Progressing  Flowsheets (Taken 7/24/2025 0750)  Promote/optimize nutrition: Consume > 50% meals/supplements  Goal: Promote skin healing  7/24/2025 0750 by Chantell Pina RN  Flowsheets (Taken 7/24/2025 0750)  Promote skin healing: Assess skin/pad under line(s)/device(s)  7/24/2025 0750 by Chantell Pina RN  Outcome: Progressing  Flowsheets (Taken 7/24/2025 0750)  Promote skin healing: Assess skin/pad under line(s)/device(s)     Problem: Pain  Goal: Takes deep breaths with improved pain control throughout the shift  Outcome: Progressing  Goal: Turns in bed with improved pain control throughout the shift  Outcome: Progressing  Goal: Walks with improved pain control throughout the shift  Outcome: Progressing  Goal: Performs ADL's with improved pain control throughout shift  Outcome: Progressing  Goal: Participates in PT with improved pain control throughout the shift  Outcome: Progressing  Goal: Free from opioid side effects throughout the shift  Outcome: Progressing  Goal: Free from acute confusion related to pain meds throughout the shift  Outcome: Progressing

## 2025-07-24 NOTE — PROGRESS NOTES
Physical Therapy    Physical Therapy Treatment    Patient Name: Maddie Briseno  MRN: [id number]  Department: [facility]  Room: [address] [place] [address]  Today's Date: 7/24/2025  Time Calculation  Start Time: 0916  Stop Time: 1016  Time Calculation (min): 60 min         Assessment/Plan         PT Plan  Treatment/Interventions: Bed mobility, Transfer training, Gait training, Stair training, Strengthening  PT Plan: Ongoing PT  PT Frequency: BID  PT Discharge Recommendations: Low intensity level of continued care  PT Recommended Transfer Status: Assist x1  PT - OK to Discharge: Yes    PT Visit Info:  PT Received On: 07/24/25          Subjective              Objective   Pain:7/10 left knee                    Treatments:  Therapeutic Exercise  Therapeutic Exercise Performed:  (supine lle ap's, qs, hs,saqs,slrs, sitting heelslides , laqs x 20 reps ea  left knee arom 7-90 degrees   cold pack to left knee post rx)    Bed Mobility  Bed Mobility:  (supine to sit and sit to supine sba)    Ambulation/Gait Training  Ambulation/Gait Training Performed:  (ambulated 80 feet x 2 using fixed wheeled walker sba)  Transfers  Transfer:  (sit to stand sba)    Stairs  Stairs:  (ascended/descended 6 inch platform step with walker cga)  observed     Outcome Measures:  [facility] Basic Mobility  Turning from your back to your side while in a flat bed without using bedrails: A little  Moving from lying on your back to sitting on the side of a flat bed without using bedrails: A little  Moving to and from bed to chair (including a wheelchair): A little  Standing up from a chair using your arms (e.g. wheelchair or bedside chair): A little  To walk in hospital room: A little  Climbing 3-5 steps with railing: A little  Basic Mobility - Total Score: 18    Education Documentation  Precautions, taught by Hannah Singh PTA at 7/24/2025 10:33 AM.  Learner: Patient  Readiness: Acceptance  Method: Demonstration  Response: Demonstrated Understanding    ADL Training,  taught by Hannah Singh PTA at 7/24/2025 10:33 AM.  Learner: Patient  Readiness: Acceptance  Method: Demonstration  Response: Demonstrated Understanding    Precautions, taught by Hannah Singh PTA at 7/24/2025 10:33 AM.  Learner: Patient  Readiness: Acceptance  Method: Demonstration  Response: Demonstrated Understanding    Mobility Training, taught by Hannah Singh PTA at 7/24/2025 10:33 AM.  Learner: Patient  Readiness: Acceptance  Method: Demonstration  Response: Demonstrated Understanding    Education Comments  No comments found.             Encounter Problems       Encounter Problems (Active)       PT Problem       STG - Pt will transition supine <> sitting with SUP  (Progressing)       Start:  07/23/25    Expected End:  08/06/25            STG - Pt will transfer STS with SUP  (Progressing)       Start:  07/23/25    Expected End:  08/06/25            STG - Pt will amb 50' using RW with SUP  (Progressing)       Start:  07/23/25    Expected End:  08/06/25            STG -  Pt will navigate 4 stairs using HR with SBA  (Progressing)       Start:  07/23/25    Expected End:  08/06/25            STG - Pt will perform a B LE ther ex program of 2-3 sets of 10  (Progressing)       Start:  07/23/25    Expected End:  08/06/25

## 2025-07-24 NOTE — PROGRESS NOTES
Maddie Briseno is a 75 y.o. female on day 0 of admission presenting with Arthritis of left knee.      Subjective   Patient seen and examined at bedside. No overnight events. Patient reports feeling well this morning. She denies dizziness with standing. She reports good appetite but has yet to have bowel movement.        Objective     Last Recorded Vitals  /51 (BP Location: Left arm, Patient Position: Sitting)   Pulse 63   Temp 36.3 °C (97.3 °F) (Temporal)   Resp 16   Wt 74 kg (163 lb 2.3 oz)   SpO2 96%   Intake/Output last 3 Shifts:    Intake/Output Summary (Last 24 hours) at 7/24/2025 1138  Last data filed at 7/24/2025 0746  Gross per 24 hour   Intake 2050.66 ml   Output 1500 ml   Net 550.66 ml       Admission Weight  Weight: 74 kg (163 lb 2.3 oz) (07/23/25 0640)    Daily Weight  07/23/25 : 74 kg (163 lb 2.3 oz)    Image Results  XR knee left 1-2 views  Narrative: Interpreted By:  Ac Rossi,   STUDY:  XR KNEE LEFT 1-2 VIEWS      INDICATION:  Signs/Symptoms:Post-op knee.      COMPARISON:  [facility]      ACCESSION NUMBER(S):  [id number]      ORDERING CLINICIAN:  TADEO LEONE      FINDINGS:  Expected postsurgical changes of left knee arthroplasty. No fracture  or malalignment seen.      Impression: Satisfactory appearance status post left knee arthroplasty.      Signed by: Ac Rossi 7/23/2025 11:45 AM  Dictation workstation:   [id number]      Physical Exam  Constitutional:       Appearance: Normal appearance.   HENT:      Mouth/Throat:      Mouth: Mucous membranes are moist.      Pharynx: Oropharynx is clear.     Eyes:      Extraocular Movements: Extraocular movements intact.      Conjunctiva/sclera: Conjunctivae normal.       Cardiovascular:      Rate and Rhythm: Normal rate and regular rhythm.   Pulmonary:      Effort: Pulmonary effort is normal.      Breath sounds: Normal breath sounds.   Abdominal:      General: Abdomen is flat.      Palpations: Abdomen is soft.      Tenderness: There is no  abdominal tenderness.     Musculoskeletal:         General: Tenderness present.      Right lower leg: No edema.      Left lower leg: Edema present.     Skin:     General: Skin is warm and dry.      Comments: Dressing over left medial knee.      Neurological:      General: No focal deficit present.      Mental Status: She is alert and oriented to person, place, and time.       Assessment & Plan  Arthritis of left knee    #S/p left total knee replacement   PLAN:  -Pain regimen: scheduled tylenol, PRN flexeril, dilaudid, roxicodone, tramadol per surgery recommendations  -completed cefazolin   -continue bowel regimen with miralax and colace  -continue xarelto as postop DVT ppx  -General surgery as primary   -[facility] 18, patient states she has PT/OT scheduled for outpatient.      Chronic medical conditions:   #HLD-continue home simvastatin   #HTN- no home medications   #CKD stage 3  #PFO  #OA-holding home ibuprofen while taking xarelto      Fluids: --  DVT ppx: Xarelto  Diet: regular   CODE status: Full code       Patt Vizcaino DO PGY-2 Internal Medicine  This is a preliminary note, please await attending attestation for final A/P.

## 2025-07-24 NOTE — CARE PLAN
The patient's goals for the shift include Pain control    The clinical goals for the shift include Pain control      Problem: Pain - Adult  Goal: Verbalizes/displays adequate comfort level or baseline comfort level  Outcome: Progressing     Problem: Safety - Adult  Goal: Free from fall injury  Outcome: Progressing     Problem: Discharge Planning  Goal: Discharge to home or other facility with appropriate resources  Outcome: Progressing     Problem: Chronic Conditions and Co-morbidities  Goal: Patient's chronic conditions and co-morbidity symptoms are monitored and maintained or improved  Outcome: Progressing     Problem: Nutrition  Goal: Nutrient intake appropriate for maintaining nutritional needs  Outcome: Progressing     Problem: Skin  Goal: Decreased wound size/increased tissue granulation at next dressing change  Outcome: Progressing  Goal: Participates in plan/prevention/treatment measures  Outcome: Progressing  Goal: Prevent/manage excess moisture  Outcome: Progressing  Goal: Prevent/minimize sheer/friction injuries  Outcome: Progressing  Goal: Promote/optimize nutrition  Outcome: Progressing  Goal: Promote skin healing  Outcome: Progressing

## 2025-07-24 NOTE — DISCHARGE SUMMARY
Discharge Diagnosis  Arthritis of left knee    Issues Requiring Follow-Up  Follow-up with Dr. Francisco in 1 month as planned status post left total knee arthroplasty     Test Results Pending At Discharge  Pending Labs       Order Current Status    Surgical Pathology Exam In process            Hospital Course   Patient progressing as expected.    Visit Vitals  BP (!) 152/98 (BP Location: Left arm, Patient Position: Lying)   Pulse 89   Temp 36.5 °C (97.7 °F) (Temporal)   Resp 20     Vitals:    07/23/25 0640   Weight: 74 kg (163 lb 2.3 oz)       Immunization History   Administered Date(s) Administered    COVID-19, mRNA, LNP-S, PF, 30 mcg/0.3 mL dose 03/30/2021, 04/20/2021    Flu vaccine (IIV4), preservative free *Check age/dose* 11/27/2023       Results      Pertinent Physical Exam At Time of Discharge  Physical Exam    Home Medications     Medication List      START taking these medications     acetaminophen 325 mg tablet; Commonly known as: Tylenol; Take 2 tablets   (650 mg) by mouth every 6 hours for 10 days.   docusate sodium 100 mg capsule; Commonly known as: Colace; Take 1   capsule (100 mg) by mouth 2 times a day for 15 days.   ondansetron 4 mg tablet; Commonly known as: Zofran; Take 1 tablet (4 mg)   by mouth every 8 hours if needed for nausea or vomiting.   oxyCODONE 5 mg immediate release tablet; Commonly known as: Roxicodone;   Take 1 tablet (5 mg) by mouth every 6 hours if needed for severe pain (7 -   10) for up to 7 days.   polyethylene glycol 17 gram packet; Commonly known as: Glycolax,   Miralax; Take 17 g by mouth once daily.   rivaroxaban 10 mg tablet; Commonly known as: Xarelto; Take 1 tablet (10   mg) by mouth once daily for 12 doses.   traMADol 50 mg tablet; Commonly known as: Ultram; Take 1 tablet (50 mg)   by mouth every 6 hours if needed for severe pain (7 - 10).     CONTINUE taking these medications     coenzyme Q-10 100 mg capsule   Estrace 0.01 % (0.1 mg/gram) vaginal cream; Generic drug:  estradiol   simvastatin 40 mg tablet; Commonly known as: Zocor; Take 1 tablet (40   mg) by mouth once daily at bedtime.   traZODone 50 mg tablet; Commonly known as: Desyrel; Take 1 tablet (50   mg) by mouth as needed at bedtime for sleep.     STOP taking these medications     aspirin 81 mg chewable tablet   chlorhexidine 0.12 % solution; Commonly known as: Peridex   Rosmery-Hex 4 % external liquid; Generic drug: chlorhexidine   ibuprofen 800 mg tablet       Outpatient Follow-Up  Future Appointments   Date Time Provider Department Center   8/11/2025 10:45 AM Janie Adrian, PT [facility]   8/13/2025 10:45 AM Anne-Marie L Camp, PTA [facility]   8/18/2025 10:45 AM Anne-Marie L Camp, PTA [facility]   8/20/2025 10:45 AM Janie Adrian, PT [facility]   8/22/2025 10:30 AM Reed Francisco MD [id number] [place]   8/25/2025 10:45 AM Anne-Marie L Camp, PTA [facility]   8/27/2025 10:45 AM Anne-Marie L Camp, PTA [facility]   9/3/2025 10:45 AM Anne-Marie L Camp, PTA [facility]   9/5/2025 10:15 AM Anne-Marie L Camp, PTA [facility]   9/8/2025 10:45 AM Anne-Marie L Camp, PTA [facility]   9/10/2025 10:45 AM Anne-Marie L Camp, PTA [facility]   9/15/2025 10:45 AM Janie Adrian, PT [facility]   9/17/2025 10:45 AM Janie Adrian, PT [facility]   11/14/2025 10:00 AM [facility] ADMIN ROOM PET CT [facility]   11/14/2025 11:00 AM PAR OPCTR PET CT [facility]   11/21/2025  9:20 AM Loc Reeder MD [id number] [facility], APRN-[facility]

## 2025-07-24 NOTE — CARE PLAN
Problem: Pain - Adult  Goal: Verbalizes/displays adequate comfort level or baseline comfort level  7/24/2025 1102 by Chantell Pina RN  Outcome: Adequate for Discharge  7/24/2025 0750 by Chantell Pina RN  Outcome: Progressing     Problem: Safety - Adult  Goal: Free from fall injury  7/24/2025 1102 by Chantell Pina RN  Outcome: Adequate for Discharge  7/24/2025 0750 by Chantell Pina RN  Outcome: Progressing     Problem: Discharge Planning  Goal: Discharge to home or other facility with appropriate resources  7/24/2025 1102 by Chantell Pina RN  Outcome: Adequate for Discharge  7/24/2025 0750 by Chantell Pina RN  Outcome: Progressing     Problem: Chronic Conditions and Co-morbidities  Goal: Patient's chronic conditions and co-morbidity symptoms are monitored and maintained or improved  7/24/2025 1102 by Chantell Pina RN  Outcome: Adequate for Discharge  7/24/2025 0750 by Chantell Pina RN  Outcome: Progressing     Problem: Nutrition  Goal: Nutrient intake appropriate for maintaining nutritional needs  7/24/2025 1102 by Chantell Pina RN  Outcome: Adequate for Discharge  7/24/2025 0750 by Chantell Pina RN  Outcome: Progressing     Problem: Skin  Goal: Decreased wound size/increased tissue granulation at next dressing change  7/24/2025 1102 by Chantell Pina RN  Outcome: Adequate for Discharge  7/24/2025 0750 by Chantell Pina RN  Flowsheets (Taken 7/24/2025 0750)  Decreased wound size/increased tissue granulation at next dressing change: Promote sleep for wound healing  7/24/2025 0750 by Chantell Pina RN  Outcome: Progressing  Flowsheets (Taken 7/24/2025 0750)  Decreased wound size/increased tissue granulation at next dressing change: Promote sleep for wound healing  Goal: Participates in plan/prevention/treatment measures  7/24/2025 1102 by Chantell Pina RN  Outcome: Adequate for Discharge  7/24/2025 0750 by Chantell Pina RN  Flowsheets (Taken 7/24/2025 0750)  Participates in plan/prevention/treatment  measures: Increase activity/out of bed for meals  7/24/2025 0750 by Chantell Pina RN  Outcome: Progressing  Flowsheets (Taken 7/24/2025 0750)  Participates in plan/prevention/treatment measures: Increase activity/out of bed for meals  Goal: Prevent/manage excess moisture  7/24/2025 1102 by Chantell Pina RN  Outcome: Adequate for Discharge  7/24/2025 0750 by Chantell Pina RN  Flowsheets (Taken 7/24/2025 0750)  Prevent/manage excess moisture: Moisturize dry skin  7/24/2025 0750 by Chantell Pina RN  Outcome: Progressing  Flowsheets (Taken 7/24/2025 0750)  Prevent/manage excess moisture: Moisturize dry skin  Goal: Prevent/minimize sheer/friction injuries  7/24/2025 1102 by Chantell Pina RN  Outcome: Adequate for Discharge  7/24/2025 0750 by Chantell Pina RN  Flowsheets (Taken 7/24/2025 0750)  Prevent/minimize sheer/friction injuries: Use pull sheet  7/24/2025 0750 by Chantell Pina RN  Outcome: Progressing  Flowsheets (Taken 7/24/2025 0750)  Prevent/minimize sheer/friction injuries: Use pull sheet  Goal: Promote/optimize nutrition  7/24/2025 1102 by Chantell Pina RN  Outcome: Adequate for Discharge  7/24/2025 0750 by Chantell Pina RN  Flowsheets (Taken 7/24/2025 0750)  Promote/optimize nutrition: Consume > 50% meals/supplements  7/24/2025 0750 by Chantell Pina RN  Outcome: Progressing  Flowsheets (Taken 7/24/2025 0750)  Promote/optimize nutrition: Consume > 50% meals/supplements  Goal: Promote skin healing  7/24/2025 1102 by Chantell Pina RN  Outcome: Adequate for Discharge  7/24/2025 0750 by Chantell Pina RN  Flowsheets (Taken 7/24/2025 0750)  Promote skin healing: Assess skin/pad under line(s)/device(s)  7/24/2025 0750 by Chantell Pina RN  Outcome: Progressing  Flowsheets (Taken 7/24/2025 0750)  Promote skin healing: Assess skin/pad under line(s)/device(s)     Problem: Pain  Goal: Takes deep breaths with improved pain control throughout the shift  7/24/2025 1102 by Chantell Pina RN  Outcome: Adequate for  Discharge  7/24/2025 0750 by Chantell Pina RN  Outcome: Progressing  Goal: Turns in bed with improved pain control throughout the shift  7/24/2025 1102 by Chantell Pina RN  Outcome: Adequate for Discharge  7/24/2025 0750 by Chantell Pina RN  Outcome: Progressing  Goal: Walks with improved pain control throughout the shift  7/24/2025 1102 by Chantell Pina RN  Outcome: Adequate for Discharge  7/24/2025 0750 by Chantell Pina RN  Outcome: Progressing  Goal: Performs ADL's with improved pain control throughout shift  7/24/2025 1102 by Chantell Pina RN  Outcome: Adequate for Discharge  7/24/2025 0750 by Chantell Pina RN  Outcome: Progressing  Goal: Participates in PT with improved pain control throughout the shift  7/24/2025 1102 by Chantell Pina RN  Outcome: Adequate for Discharge  7/24/2025 0750 by Chantell Pina RN  Outcome: Progressing  Goal: Free from opioid side effects throughout the shift  7/24/2025 1102 by Chantell Pina RN  Outcome: Adequate for Discharge  7/24/2025 0750 by Chantell Pina RN  Outcome: Progressing  Goal: Free from acute confusion related to pain meds throughout the shift  7/24/2025 1102 by Chantell Pina RN  Outcome: Adequate for Discharge  7/24/2025 0750 by Chantell Pina RN  Outcome: Progressing

## 2025-07-25 ENCOUNTER — HOME CARE VISIT (OUTPATIENT)
Dept: HOME HEALTH SERVICES | Facility: HOME HEALTH | Age: 75
End: 2025-07-25
Payer: MEDICARE

## 2025-07-25 VITALS
RESPIRATION RATE: 18 BRPM | TEMPERATURE: 98.2 F | HEART RATE: 86 BPM | SYSTOLIC BLOOD PRESSURE: 138 MMHG | OXYGEN SATURATION: 97 % | DIASTOLIC BLOOD PRESSURE: 60 MMHG

## 2025-07-25 VITALS — RESPIRATION RATE: 20 BRPM

## 2025-07-25 PROCEDURE — G0151 HHCP-SERV OF PT,EA 15 MIN: HCPCS

## 2025-07-25 PROCEDURE — G0299 HHS/HOSPICE OF RN EA 15 MIN: HCPCS

## 2025-07-25 SDOH — HEALTH STABILITY: PHYSICAL HEALTH: PHYSICAL EXERCISE: 5

## 2025-07-25 SDOH — HEALTH STABILITY: PHYSICAL HEALTH: PHYSICAL EXERCISE: SUPINE

## 2025-07-25 SDOH — HEALTH STABILITY: PHYSICAL HEALTH: EXERCISE TYPE: TKR PROTOCOL

## 2025-07-25 SDOH — HEALTH STABILITY: PHYSICAL HEALTH: EXERCISE ACTIVITY: TKR PROTOCOL

## 2025-07-25 SDOH — HEALTH STABILITY: PHYSICAL HEALTH: EXERCISE ACTIVITIES SETS: 1

## 2025-07-25 ASSESSMENT — ENCOUNTER SYMPTOMS
MUSCLE WEAKNESS: 1
PAIN LOCATION: LEFT KNEE
ANGER WITHIN DEFINED LIMITS: 1
HIGHEST PAIN SEVERITY IN PAST 24 HOURS: 6/10
PERSON REPORTING PAIN: PATIENT
APPETITE LEVEL: FAIR
AGGRESSION WITHIN DEFINED LIMITS: 1
PAIN SEVERITY GOAL: 0/10
LOWEST PAIN SEVERITY IN PAST 24 HOURS: 0/10
PAIN SEVERITY GOAL: 0/10
PAIN: 1
HIGHEST PAIN SEVERITY IN PAST 24 HOURS: 6/10
LIMITED RANGE OF MOTION: 1
SLEEP QUALITY: ADEQUATE
LOWEST PAIN SEVERITY IN PAST 24 HOURS: 4/10
SUBJECTIVE PAIN PROGRESSION: RAPIDLY IMPROVING
OCCASIONAL FEELINGS OF UNSTEADINESS: 1

## 2025-07-25 ASSESSMENT — ACTIVITIES OF DAILY LIVING (ADL)
ENTERING_EXITING_HOME: STAND BY ASSIST
OASIS_M1830: 03
AMBULATION_DISTANCE/DURATION_TOLERATED: 20 FT X2
AMBULATION ASSISTANCE ON FLAT SURFACES: 1

## 2025-07-25 ASSESSMENT — PAIN SCALES - PAIN ASSESSMENT IN ADVANCED DEMENTIA (PAINAD): BREATHING: 0

## 2025-07-28 ENCOUNTER — HOME CARE VISIT (OUTPATIENT)
Dept: HOME HEALTH SERVICES | Facility: HOME HEALTH | Age: 75
End: 2025-07-28
Payer: MEDICARE

## 2025-07-28 VITALS — OXYGEN SATURATION: 98 %

## 2025-07-28 PROCEDURE — G0157 HHC PT ASSISTANT EA 15: HCPCS

## 2025-07-28 ASSESSMENT — ENCOUNTER SYMPTOMS
DENIES PAIN: 1
PERSON REPORTING PAIN: PATIENT

## 2025-07-29 ENCOUNTER — TELEPHONE (OUTPATIENT)
Dept: ORTHOPEDIC SURGERY | Facility: CLINIC | Age: 75
End: 2025-07-29
Payer: MEDICARE

## 2025-07-29 DIAGNOSIS — M17.12 ARTHRITIS OF LEFT KNEE: ICD-10-CM

## 2025-07-29 RX ORDER — OXYCODONE HYDROCHLORIDE 5 MG/1
5 TABLET ORAL EVERY 6 HOURS PRN
Qty: 28 TABLET | Refills: 0 | Status: SHIPPED | OUTPATIENT
Start: 2025-07-29 | End: 2025-08-05

## 2025-07-29 RX ORDER — TRAMADOL HYDROCHLORIDE 50 MG/1
50 TABLET, FILM COATED ORAL EVERY 6 HOURS PRN
Qty: 28 TABLET | Refills: 0 | Status: SHIPPED | OUTPATIENT
Start: 2025-07-29

## 2025-07-29 NOTE — TELEPHONE ENCOUNTER
Pt wants her Tramadol 50mg and her oxycodone 5mg She has 2 tabs left of each medication. Oarrs on file and checked today. Thank you. Pt prefers [facility].    [facility]  [address] [place], [place] [id number]  Phone: [phone] Fax: [phone]

## 2025-07-30 ENCOUNTER — HOME CARE VISIT (OUTPATIENT)
Dept: HOME HEALTH SERVICES | Facility: HOME HEALTH | Age: 75
End: 2025-07-30
Payer: MEDICARE

## 2025-07-30 VITALS — OXYGEN SATURATION: 98 %

## 2025-07-30 PROCEDURE — G0157 HHC PT ASSISTANT EA 15: HCPCS

## 2025-07-30 ASSESSMENT — ENCOUNTER SYMPTOMS
DENIES PAIN: 1
PERSON REPORTING PAIN: PATIENT

## 2025-08-01 ENCOUNTER — HOME CARE VISIT (OUTPATIENT)
Dept: HOME HEALTH SERVICES | Facility: HOME HEALTH | Age: 75
End: 2025-08-01
Payer: MEDICARE

## 2025-08-01 VITALS — OXYGEN SATURATION: 98 %

## 2025-08-01 PROCEDURE — G0157 HHC PT ASSISTANT EA 15: HCPCS

## 2025-08-01 ASSESSMENT — ENCOUNTER SYMPTOMS
HIGHEST PAIN SEVERITY IN PAST 24 HOURS: 5/10
PAIN: 1
PAIN LOCATION: LEFT KNEE
PERSON REPORTING PAIN: PATIENT

## 2025-08-04 ENCOUNTER — HOME CARE VISIT (OUTPATIENT)
Dept: HOME HEALTH SERVICES | Facility: HOME HEALTH | Age: 75
End: 2025-08-04
Payer: MEDICARE

## 2025-08-04 VITALS — OXYGEN SATURATION: 98 %

## 2025-08-04 PROCEDURE — G0157 HHC PT ASSISTANT EA 15: HCPCS

## 2025-08-04 ASSESSMENT — ENCOUNTER SYMPTOMS
PERSON REPORTING PAIN: PATIENT
HIGHEST PAIN SEVERITY IN PAST 24 HOURS: 4/10
PAIN LOCATION: LEFT KNEE
PAIN: 1

## 2025-08-06 ENCOUNTER — HOME CARE VISIT (OUTPATIENT)
Dept: HOME HEALTH SERVICES | Facility: HOME HEALTH | Age: 75
End: 2025-08-06
Payer: MEDICARE

## 2025-08-06 ENCOUNTER — TELEPHONE (OUTPATIENT)
Dept: ORTHOPEDIC SURGERY | Facility: CLINIC | Age: 75
End: 2025-08-06
Payer: MEDICARE

## 2025-08-06 VITALS
OXYGEN SATURATION: 96 % | SYSTOLIC BLOOD PRESSURE: 104 MMHG | TEMPERATURE: 97.7 F | HEART RATE: 74 BPM | RESPIRATION RATE: 18 BRPM | DIASTOLIC BLOOD PRESSURE: 60 MMHG

## 2025-08-06 DIAGNOSIS — M17.12 ARTHRITIS OF LEFT KNEE: ICD-10-CM

## 2025-08-06 LAB
LABORATORY COMMENT REPORT: NORMAL
PATH REPORT.FINAL DX SPEC: NORMAL
PATH REPORT.GROSS SPEC: NORMAL
PATH REPORT.RELEVANT HX SPEC: NORMAL
PATH REPORT.TOTAL CANCER: NORMAL

## 2025-08-06 PROCEDURE — G0299 HHS/HOSPICE OF RN EA 15 MIN: HCPCS

## 2025-08-06 PROCEDURE — RXMED WILLOW AMBULATORY MEDICATION CHARGE

## 2025-08-06 RX ORDER — TRAMADOL HYDROCHLORIDE 50 MG/1
50 TABLET, FILM COATED ORAL EVERY 6 HOURS PRN
Qty: 28 TABLET | Refills: 0 | Status: SHIPPED | OUTPATIENT
Start: 2025-08-06

## 2025-08-06 ASSESSMENT — ENCOUNTER SYMPTOMS
LIMITED RANGE OF MOTION: 1
PAIN SEVERITY GOAL: 0/10
HIGHEST PAIN SEVERITY IN PAST 24 HOURS: 0/10
LOWEST PAIN SEVERITY IN PAST 24 HOURS: 0/10
APPETITE LEVEL: FAIR

## 2025-08-06 ASSESSMENT — PAIN SCALES - PAIN ASSESSMENT IN ADVANCED DEMENTIA (PAINAD): BREATHING: 0

## 2025-08-07 ENCOUNTER — HOME CARE VISIT (OUTPATIENT)
Dept: HOME HEALTH SERVICES | Facility: HOME HEALTH | Age: 75
End: 2025-08-07
Payer: MEDICARE

## 2025-08-07 ENCOUNTER — PHARMACY VISIT (OUTPATIENT)
Dept: PHARMACY | Facility: CLINIC | Age: 75
End: 2025-08-07
Payer: COMMERCIAL

## 2025-08-07 PROCEDURE — G0151 HHCP-SERV OF PT,EA 15 MIN: HCPCS

## 2025-08-07 SDOH — HEALTH STABILITY: PHYSICAL HEALTH: EXERCISE ACTIVITIES SETS: 1

## 2025-08-07 SDOH — HEALTH STABILITY: PHYSICAL HEALTH: PHYSICAL EXERCISE: 10

## 2025-08-07 SDOH — HEALTH STABILITY: PHYSICAL HEALTH: PHYSICAL EXERCISE: STANDING AND SITTING

## 2025-08-07 SDOH — HEALTH STABILITY: PHYSICAL HEALTH: EXERCISE TYPE: WHEP

## 2025-08-07 SDOH — HEALTH STABILITY: PHYSICAL HEALTH: EXERCISE ACTIVITY: SITTING AND STANDING STRENGTHENING AND STRETCHING

## 2025-08-07 ASSESSMENT — ACTIVITIES OF DAILY LIVING (ADL)
AMBULATION_DISTANCE/DURATION_TOLERATED: 100 FEET X2
OASIS_M1830: 01
HOME_HEALTH_OASIS: 00
AMBULATION ASSISTANCE ON FLAT SURFACES: 1

## 2025-08-07 ASSESSMENT — ENCOUNTER SYMPTOMS
HIGHEST PAIN SEVERITY IN PAST 24 HOURS: 4/10
PAIN SEVERITY GOAL: 0/10
LOWEST PAIN SEVERITY IN PAST 24 HOURS: 2/10
PERSON REPORTING PAIN: PATIENT
SUBJECTIVE PAIN PROGRESSION: GRADUALLY IMPROVING
PAIN LOCATION: LEFT KNEE
PAIN: 1

## 2025-08-07 NOTE — CASE COMMUNICATION
Patient to be discharged from PT and all [facility] services 8.7.25.  Instructed patient in signs and symptoms of infection, when to call MD or 911, safe progression of WHEP, fall prevention, safety with household transfers and ambulation on level and stairs.  Patient to begin outpatient PT to begin next week.

## 2025-08-11 ENCOUNTER — HOSPITAL ENCOUNTER (EMERGENCY)
Facility: HOSPITAL | Age: 75
Discharge: HOME | End: 2025-08-11
Attending: EMERGENCY MEDICINE
Payer: MEDICARE

## 2025-08-11 ENCOUNTER — APPOINTMENT (OUTPATIENT)
Dept: RADIOLOGY | Facility: HOSPITAL | Age: 75
End: 2025-08-11
Payer: MEDICARE

## 2025-08-11 ENCOUNTER — APPOINTMENT (OUTPATIENT)
Dept: PHYSICAL THERAPY | Facility: CLINIC | Age: 75
End: 2025-08-11
Payer: MEDICARE

## 2025-08-11 ENCOUNTER — APPOINTMENT (OUTPATIENT)
Dept: CARDIOLOGY | Facility: HOSPITAL | Age: 75
End: 2025-08-11
Payer: MEDICARE

## 2025-08-11 ENCOUNTER — APPOINTMENT (OUTPATIENT)
Dept: VASCULAR MEDICINE | Facility: HOSPITAL | Age: 75
End: 2025-08-11
Payer: MEDICARE

## 2025-08-11 VITALS
BODY MASS INDEX: 29.44 KG/M2 | SYSTOLIC BLOOD PRESSURE: 161 MMHG | HEIGHT: 62 IN | HEART RATE: 72 BPM | OXYGEN SATURATION: 99 % | WEIGHT: 160 LBS | RESPIRATION RATE: 18 BRPM | TEMPERATURE: 97.9 F | DIASTOLIC BLOOD PRESSURE: 73 MMHG

## 2025-08-11 DIAGNOSIS — R22.43 LOCALIZED SWELLING, MASS AND LUMP, LOWER LIMB, BILATERAL: ICD-10-CM

## 2025-08-11 DIAGNOSIS — M25.562 ACUTE PAIN OF LEFT KNEE: ICD-10-CM

## 2025-08-11 DIAGNOSIS — R25.1 SHAKING: Primary | ICD-10-CM

## 2025-08-11 LAB
ALBUMIN SERPL BCP-MCNC: 4.5 G/DL (ref 3.4–5)
ALP SERPL-CCNC: 75 U/L (ref 33–136)
ALT SERPL W P-5'-P-CCNC: 11 U/L (ref 7–45)
ANION GAP SERPL CALC-SCNC: 13 MMOL/L (ref 10–20)
APPEARANCE UR: CLEAR
AST SERPL W P-5'-P-CCNC: 14 U/L (ref 9–39)
BASOPHILS # BLD AUTO: 0.07 X10*3/UL (ref 0–0.1)
BASOPHILS NFR BLD AUTO: 1.5 %
BILIRUB SERPL-MCNC: 0.7 MG/DL (ref 0–1.2)
BILIRUB UR STRIP.AUTO-MCNC: NEGATIVE MG/DL
BUN SERPL-MCNC: 19 MG/DL (ref 6–23)
CALCIUM SERPL-MCNC: 9.4 MG/DL (ref 8.6–10.3)
CARDIAC TROPONIN I PNL SERPL HS: 4 NG/L (ref 0–13)
CARDIAC TROPONIN I PNL SERPL HS: 5 NG/L (ref 0–13)
CHLORIDE SERPL-SCNC: 103 MMOL/L (ref 98–107)
CO2 SERPL-SCNC: 25 MMOL/L (ref 21–32)
COLOR UR: COLORLESS
CREAT SERPL-MCNC: 1.01 MG/DL (ref 0.5–1.05)
EGFRCR SERPLBLD CKD-EPI 2021: 59 ML/MIN/1.73M*2
EOSINOPHIL # BLD AUTO: 0.03 X10*3/UL (ref 0–0.4)
EOSINOPHIL NFR BLD AUTO: 0.6 %
ERYTHROCYTE [DISTWIDTH] IN BLOOD BY AUTOMATED COUNT: 13 % (ref 11.5–14.5)
GLUCOSE BLD MANUAL STRIP-MCNC: 88 MG/DL (ref 74–99)
GLUCOSE SERPL-MCNC: 88 MG/DL (ref 74–99)
GLUCOSE UR STRIP.AUTO-MCNC: NORMAL MG/DL
HCT VFR BLD AUTO: 37.8 % (ref 36–46)
HGB BLD-MCNC: 12 G/DL (ref 12–16)
IMM GRANULOCYTES # BLD AUTO: 0.01 X10*3/UL (ref 0–0.5)
IMM GRANULOCYTES NFR BLD AUTO: 0.2 % (ref 0–0.9)
INR PPP: 1 (ref 0.9–1.1)
KETONES UR STRIP.AUTO-MCNC: NEGATIVE MG/DL
LEUKOCYTE ESTERASE UR QL STRIP.AUTO: NEGATIVE
LYMPHOCYTES # BLD AUTO: 1.4 X10*3/UL (ref 0.8–3)
LYMPHOCYTES NFR BLD AUTO: 29.5 %
MAGNESIUM SERPL-MCNC: 2.44 MG/DL (ref 1.6–2.4)
MCH RBC QN AUTO: 27.5 PG (ref 26–34)
MCHC RBC AUTO-ENTMCNC: 31.7 G/DL (ref 32–36)
MCV RBC AUTO: 87 FL (ref 80–100)
MONOCYTES # BLD AUTO: 0.57 X10*3/UL (ref 0.05–0.8)
MONOCYTES NFR BLD AUTO: 12 %
NEUTROPHILS # BLD AUTO: 2.66 X10*3/UL (ref 1.6–5.5)
NEUTROPHILS NFR BLD AUTO: 56.2 %
NITRITE UR QL STRIP.AUTO: NEGATIVE
NRBC BLD-RTO: 0 /100 WBCS (ref 0–0)
PH UR STRIP.AUTO: 7.5 [PH]
PLATELET # BLD AUTO: 314 X10*3/UL (ref 150–450)
POTASSIUM SERPL-SCNC: 3.9 MMOL/L (ref 3.5–5.3)
PROT SERPL-MCNC: 7.4 G/DL (ref 6.4–8.2)
PROT UR STRIP.AUTO-MCNC: NEGATIVE MG/DL
PROTHROMBIN TIME: 11.2 SECONDS (ref 9.8–12.4)
RBC # BLD AUTO: 4.36 X10*6/UL (ref 4–5.2)
RBC # UR STRIP.AUTO: NEGATIVE MG/DL
SODIUM SERPL-SCNC: 137 MMOL/L (ref 136–145)
SP GR UR STRIP.AUTO: 1.01
UROBILINOGEN UR STRIP.AUTO-MCNC: NORMAL MG/DL
WBC # BLD AUTO: 4.7 X10*3/UL (ref 4.4–11.3)

## 2025-08-11 PROCEDURE — 84484 ASSAY OF TROPONIN QUANT: CPT | Performed by: EMERGENCY MEDICINE

## 2025-08-11 PROCEDURE — 85025 COMPLETE CBC W/AUTO DIFF WBC: CPT | Performed by: EMERGENCY MEDICINE

## 2025-08-11 PROCEDURE — 83735 ASSAY OF MAGNESIUM: CPT | Performed by: EMERGENCY MEDICINE

## 2025-08-11 PROCEDURE — 71045 X-RAY EXAM CHEST 1 VIEW: CPT

## 2025-08-11 PROCEDURE — 81003 URINALYSIS AUTO W/O SCOPE: CPT | Performed by: EMERGENCY MEDICINE

## 2025-08-11 PROCEDURE — 36415 COLL VENOUS BLD VENIPUNCTURE: CPT | Performed by: EMERGENCY MEDICINE

## 2025-08-11 PROCEDURE — 2500000001 HC RX 250 WO HCPCS SELF ADMINISTERED DRUGS (ALT 637 FOR MEDICARE OP): Performed by: EMERGENCY MEDICINE

## 2025-08-11 PROCEDURE — 82947 ASSAY GLUCOSE BLOOD QUANT: CPT | Mod: 59

## 2025-08-11 PROCEDURE — 80053 COMPREHEN METABOLIC PANEL: CPT | Performed by: EMERGENCY MEDICINE

## 2025-08-11 PROCEDURE — 71045 X-RAY EXAM CHEST 1 VIEW: CPT | Performed by: RADIOLOGY

## 2025-08-11 PROCEDURE — 93970 EXTREMITY STUDY: CPT | Performed by: STUDENT IN AN ORGANIZED HEALTH CARE EDUCATION/TRAINING PROGRAM

## 2025-08-11 PROCEDURE — 93005 ELECTROCARDIOGRAM TRACING: CPT

## 2025-08-11 PROCEDURE — 93970 EXTREMITY STUDY: CPT

## 2025-08-11 PROCEDURE — 70450 CT HEAD/BRAIN W/O DYE: CPT | Performed by: RADIOLOGY

## 2025-08-11 PROCEDURE — 70450 CT HEAD/BRAIN W/O DYE: CPT

## 2025-08-11 PROCEDURE — 85610 PROTHROMBIN TIME: CPT | Performed by: EMERGENCY MEDICINE

## 2025-08-11 PROCEDURE — 99285 EMERGENCY DEPT VISIT HI MDM: CPT | Mod: 25 | Performed by: EMERGENCY MEDICINE

## 2025-08-11 RX ORDER — TRAMADOL HYDROCHLORIDE 50 MG/1
50 TABLET, FILM COATED ORAL ONCE
Status: COMPLETED | OUTPATIENT
Start: 2025-08-11 | End: 2025-08-11

## 2025-08-11 RX ADMIN — TRAMADOL HYDROCHLORIDE 50 MG: 50 TABLET, COATED ORAL at 12:58

## 2025-08-12 LAB — HOLD SPECIMEN: NORMAL

## 2025-08-13 ENCOUNTER — EVALUATION (OUTPATIENT)
Dept: PHYSICAL THERAPY | Facility: CLINIC | Age: 75
End: 2025-08-13
Payer: MEDICARE

## 2025-08-13 DIAGNOSIS — M25.562 CHRONIC PAIN OF LEFT KNEE: ICD-10-CM

## 2025-08-13 DIAGNOSIS — G89.29 CHRONIC PAIN OF LEFT KNEE: ICD-10-CM

## 2025-08-13 DIAGNOSIS — M17.12 PRIMARY OSTEOARTHRITIS OF LEFT KNEE: Primary | ICD-10-CM

## 2025-08-13 PROCEDURE — 97161 PT EVAL LOW COMPLEX 20 MIN: CPT | Mod: GP

## 2025-08-13 PROCEDURE — 97110 THERAPEUTIC EXERCISES: CPT | Mod: GP

## 2025-08-18 ENCOUNTER — DOCUMENTATION (OUTPATIENT)
Dept: PHYSICAL THERAPY | Facility: CLINIC | Age: 75
End: 2025-08-18
Payer: MEDICARE

## 2025-08-18 ENCOUNTER — PHARMACY VISIT (OUTPATIENT)
Dept: PHARMACY | Facility: CLINIC | Age: 75
End: 2025-08-18
Payer: COMMERCIAL

## 2025-08-18 ENCOUNTER — APPOINTMENT (OUTPATIENT)
Dept: PHYSICAL THERAPY | Facility: CLINIC | Age: 75
End: 2025-08-18
Payer: MEDICARE

## 2025-08-18 ENCOUNTER — TELEPHONE (OUTPATIENT)
Dept: ORTHOPEDIC SURGERY | Facility: CLINIC | Age: 75
End: 2025-08-18
Payer: MEDICARE

## 2025-08-18 DIAGNOSIS — M17.12 ARTHRITIS OF LEFT KNEE: ICD-10-CM

## 2025-08-18 PROCEDURE — RXMED WILLOW AMBULATORY MEDICATION CHARGE

## 2025-08-18 RX ORDER — TRAMADOL HYDROCHLORIDE 50 MG/1
50 TABLET, FILM COATED ORAL EVERY 6 HOURS PRN
Qty: 28 TABLET | Refills: 0 | Status: SHIPPED | OUTPATIENT
Start: 2025-08-18

## 2025-08-20 ENCOUNTER — TREATMENT (OUTPATIENT)
Dept: PHYSICAL THERAPY | Facility: CLINIC | Age: 75
End: 2025-08-20
Payer: MEDICARE

## 2025-08-20 DIAGNOSIS — M25.562 CHRONIC PAIN OF LEFT KNEE: ICD-10-CM

## 2025-08-20 DIAGNOSIS — M17.12 PRIMARY OSTEOARTHRITIS OF LEFT KNEE: ICD-10-CM

## 2025-08-20 DIAGNOSIS — G89.29 CHRONIC PAIN OF LEFT KNEE: ICD-10-CM

## 2025-08-20 PROCEDURE — 97010 HOT OR COLD PACKS THERAPY: CPT | Mod: GP

## 2025-08-20 PROCEDURE — 97110 THERAPEUTIC EXERCISES: CPT | Mod: GP

## 2025-08-22 ENCOUNTER — OFFICE VISIT (OUTPATIENT)
Dept: ORTHOPEDIC SURGERY | Facility: CLINIC | Age: 75
End: 2025-08-22
Payer: MEDICARE

## 2025-08-22 ENCOUNTER — HOSPITAL ENCOUNTER (OUTPATIENT)
Dept: RADIOLOGY | Facility: CLINIC | Age: 75
Discharge: HOME | End: 2025-08-22
Payer: MEDICARE

## 2025-08-22 VITALS — HEIGHT: 62 IN | BODY MASS INDEX: 29.44 KG/M2 | WEIGHT: 160 LBS

## 2025-08-22 DIAGNOSIS — Z96.652 STATUS POST LEFT KNEE REPLACEMENT: ICD-10-CM

## 2025-08-22 PROCEDURE — 1160F RVW MEDS BY RX/DR IN RCRD: CPT | Performed by: ORTHOPAEDIC SURGERY

## 2025-08-22 PROCEDURE — 99024 POSTOP FOLLOW-UP VISIT: CPT | Performed by: ORTHOPAEDIC SURGERY

## 2025-08-22 PROCEDURE — 1159F MED LIST DOCD IN RCRD: CPT | Performed by: ORTHOPAEDIC SURGERY

## 2025-08-22 PROCEDURE — 73560 X-RAY EXAM OF KNEE 1 OR 2: CPT | Mod: LT

## 2025-08-22 PROCEDURE — 1036F TOBACCO NON-USER: CPT | Performed by: ORTHOPAEDIC SURGERY

## 2025-08-22 PROCEDURE — 99212 OFFICE O/P EST SF 10 MIN: CPT | Performed by: ORTHOPAEDIC SURGERY

## 2025-08-22 PROCEDURE — 3008F BODY MASS INDEX DOCD: CPT | Performed by: ORTHOPAEDIC SURGERY

## 2025-08-25 ENCOUNTER — TREATMENT (OUTPATIENT)
Dept: PHYSICAL THERAPY | Facility: CLINIC | Age: 75
End: 2025-08-25
Payer: MEDICARE

## 2025-08-25 DIAGNOSIS — M25.562 CHRONIC PAIN OF LEFT KNEE: ICD-10-CM

## 2025-08-25 DIAGNOSIS — G89.29 CHRONIC PAIN OF LEFT KNEE: ICD-10-CM

## 2025-08-25 DIAGNOSIS — M17.12 PRIMARY OSTEOARTHRITIS OF LEFT KNEE: ICD-10-CM

## 2025-08-25 PROCEDURE — 97140 MANUAL THERAPY 1/> REGIONS: CPT | Mod: GP,CQ

## 2025-08-25 PROCEDURE — 97110 THERAPEUTIC EXERCISES: CPT | Mod: GP,CQ

## 2025-08-25 PROCEDURE — 97014 ELECTRIC STIMULATION THERAPY: CPT | Mod: GP,CQ

## 2025-08-27 ENCOUNTER — TREATMENT (OUTPATIENT)
Dept: PHYSICAL THERAPY | Facility: CLINIC | Age: 75
End: 2025-08-27
Payer: MEDICARE

## 2025-08-27 DIAGNOSIS — M25.562 CHRONIC PAIN OF LEFT KNEE: ICD-10-CM

## 2025-08-27 DIAGNOSIS — G89.29 CHRONIC PAIN OF LEFT KNEE: ICD-10-CM

## 2025-08-27 DIAGNOSIS — M17.12 PRIMARY OSTEOARTHRITIS OF LEFT KNEE: ICD-10-CM

## 2025-08-27 PROCEDURE — 97110 THERAPEUTIC EXERCISES: CPT | Mod: GP,CQ

## 2025-08-27 PROCEDURE — 97014 ELECTRIC STIMULATION THERAPY: CPT | Mod: GP,CQ

## 2025-08-27 PROCEDURE — 97140 MANUAL THERAPY 1/> REGIONS: CPT | Mod: GP,CQ

## 2025-09-03 ENCOUNTER — TREATMENT (OUTPATIENT)
Dept: PHYSICAL THERAPY | Facility: CLINIC | Age: 75
End: 2025-09-03
Payer: MEDICARE

## 2025-09-03 DIAGNOSIS — M25.562 CHRONIC PAIN OF LEFT KNEE: ICD-10-CM

## 2025-09-03 DIAGNOSIS — G89.29 CHRONIC PAIN OF LEFT KNEE: ICD-10-CM

## 2025-09-03 DIAGNOSIS — M17.12 PRIMARY OSTEOARTHRITIS OF LEFT KNEE: ICD-10-CM

## 2025-09-03 PROCEDURE — 97014 ELECTRIC STIMULATION THERAPY: CPT | Mod: GP,CQ

## 2025-09-03 PROCEDURE — 97110 THERAPEUTIC EXERCISES: CPT | Mod: GP,CQ

## 2025-09-05 ENCOUNTER — TREATMENT (OUTPATIENT)
Dept: PHYSICAL THERAPY | Facility: CLINIC | Age: 75
End: 2025-09-05
Payer: MEDICARE

## 2025-09-05 DIAGNOSIS — G89.29 CHRONIC PAIN OF LEFT KNEE: ICD-10-CM

## 2025-09-05 DIAGNOSIS — M17.12 PRIMARY OSTEOARTHRITIS OF LEFT KNEE: ICD-10-CM

## 2025-09-05 DIAGNOSIS — M25.562 CHRONIC PAIN OF LEFT KNEE: ICD-10-CM

## 2025-09-05 PROCEDURE — 97140 MANUAL THERAPY 1/> REGIONS: CPT | Mod: GP,CQ

## 2025-09-05 PROCEDURE — 97110 THERAPEUTIC EXERCISES: CPT | Mod: GP,CQ

## (undated) DEVICE — BANDAGE, COFLEX, 6 X 5 YDS, FOAM TAN, STERILE, LF

## (undated) DEVICE — Device

## (undated) DEVICE — SLEEVE, VASO PRESS, CALF GARMENT, MEDIUM, GREEN

## (undated) DEVICE — BANDAGE, ELASTIC, ACE, ACE, DOUBLE LENGTH, 6 X 550 IN, LF

## (undated) DEVICE — DRAPE, SHEET, THREE QUARTER, FAN FOLD, 57 X 77 IN

## (undated) DEVICE — DRESSING, MEPILEX BORDER, POST-OP AG, 4 X 12 IN

## (undated) DEVICE — SPONGE, LAP, XRAY DECT, 18IN X 18IN, W/LOOP, STERILE

## (undated) DEVICE — BLADE, SAGITTAL DUAL CUT, 25 X 90 X 1.27

## (undated) DEVICE — DRAPE, U-DRAPE, NON STERILE

## (undated) DEVICE — WOUND SYSTEM, DEBRIDEMENT & CLEANING, O.R DUOPAK

## (undated) DEVICE — MARKER, SKIN, REGULAR TIP, W/FLEXI-RULER

## (undated) DEVICE — BLADE, SAW, SAGITTAL, 12.5 X 81.5 X 1.27 MM, STAINLESS STEEL, STERILE

## (undated) DEVICE — DRAPE, TIBURON, SPLIT SHEET, REINF ADH STRIP, 77X108

## (undated) DEVICE — CEMENT, MIXEVAC III, 10S BOWL, KNEES

## (undated) DEVICE — STRIP, SKIN CLOSURE, STERI STRIP, REINFORCED, 0.5 X 4 IN

## (undated) DEVICE — GOWN, SURGICAL, IMPLT, BACK, XLARGE, XLONG, STERILE